# Patient Record
Sex: FEMALE | Race: BLACK OR AFRICAN AMERICAN | Employment: OTHER | URBAN - METROPOLITAN AREA
[De-identification: names, ages, dates, MRNs, and addresses within clinical notes are randomized per-mention and may not be internally consistent; named-entity substitution may affect disease eponyms.]

---

## 2017-05-07 ENCOUNTER — APPOINTMENT (OUTPATIENT)
Dept: CT IMAGING | Facility: CLINIC | Age: 82
End: 2017-05-07
Attending: EMERGENCY MEDICINE
Payer: MEDICARE

## 2017-05-07 ENCOUNTER — HOSPITAL ENCOUNTER (EMERGENCY)
Facility: CLINIC | Age: 82
Discharge: HOME | End: 2017-05-07
Attending: EMERGENCY MEDICINE | Admitting: EMERGENCY MEDICINE
Payer: MEDICARE

## 2017-05-07 VITALS
DIASTOLIC BLOOD PRESSURE: 84 MMHG | TEMPERATURE: 97.9 F | SYSTOLIC BLOOD PRESSURE: 167 MMHG | HEART RATE: 68 BPM | RESPIRATION RATE: 20 BRPM | OXYGEN SATURATION: 99 %
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
## 2017-05-07 DIAGNOSIS — N39.0 UTI (URINARY TRACT INFECTION), UNCOMPLICATED: ICD-10-CM

## 2017-05-07 DIAGNOSIS — S09.90XA CLOSED HEAD INJURY, INITIAL ENCOUNTER: ICD-10-CM

## 2017-05-07 LAB
ALBUMIN UR-MCNC: 30 MG/DL
AMORPH CRY #/AREA URNS HPF: ABNORMAL /HPF
ANION GAP SERPL CALCULATED.3IONS-SCNC: 5 MMOL/L (ref 3–14)
APPEARANCE UR: ABNORMAL
BASOPHILS # BLD AUTO: 0 10E9/L (ref 0–0.2)
BASOPHILS NFR BLD AUTO: 0.6 %
BILIRUB UR QL STRIP: NEGATIVE
BUN SERPL-MCNC: 28 MG/DL (ref 7–30)
CALCIUM SERPL-MCNC: 8.9 MG/DL (ref 8.5–10.1)
CHLORIDE SERPL-SCNC: 108 MMOL/L (ref 94–109)
CO2 SERPL-SCNC: 30 MMOL/L (ref 20–32)
COLOR UR AUTO: YELLOW
CREAT SERPL-MCNC: 1.25 MG/DL (ref 0.52–1.04)
DIFFERENTIAL METHOD BLD: ABNORMAL
EOSINOPHIL # BLD AUTO: 0.2 10E9/L (ref 0–0.7)
EOSINOPHIL NFR BLD AUTO: 2.8 %
ERYTHROCYTE [DISTWIDTH] IN BLOOD BY AUTOMATED COUNT: 12.5 % (ref 10–15)
GFR SERPL CREATININE-BSD FRML MDRD: 41 ML/MIN/1.7M2
GLUCOSE BLDC GLUCOMTR-MCNC: 102 MG/DL (ref 70–99)
GLUCOSE SERPL-MCNC: 118 MG/DL (ref 70–99)
GLUCOSE UR STRIP-MCNC: NEGATIVE MG/DL
HCT VFR BLD AUTO: 31.9 % (ref 35–47)
HGB BLD-MCNC: 10.4 G/DL (ref 11.7–15.7)
HGB UR QL STRIP: NEGATIVE
IMM GRANULOCYTES # BLD: 0 10E9/L (ref 0–0.4)
IMM GRANULOCYTES NFR BLD: 0.2 %
INR PPP: 0.97 (ref 0.86–1.14)
KETONES UR STRIP-MCNC: NEGATIVE MG/DL
LEUKOCYTE ESTERASE UR QL STRIP: ABNORMAL
LYMPHOCYTES # BLD AUTO: 1.6 10E9/L (ref 0.8–5.3)
LYMPHOCYTES NFR BLD AUTO: 30.3 %
MCH RBC QN AUTO: 30.1 PG (ref 26.5–33)
MCHC RBC AUTO-ENTMCNC: 32.6 G/DL (ref 31.5–36.5)
MCV RBC AUTO: 92 FL (ref 78–100)
MONOCYTES # BLD AUTO: 0.5 10E9/L (ref 0–1.3)
MONOCYTES NFR BLD AUTO: 8.7 %
MUCOUS THREADS #/AREA URNS LPF: PRESENT /LPF
NEUTROPHILS # BLD AUTO: 3.1 10E9/L (ref 1.6–8.3)
NEUTROPHILS NFR BLD AUTO: 57.4 %
NITRATE UR QL: NEGATIVE
NRBC # BLD AUTO: 0 10*3/UL
NRBC BLD AUTO-RTO: 0 /100
PH UR STRIP: 6 PH (ref 5–7)
PLATELET # BLD AUTO: 176 10E9/L (ref 150–450)
POTASSIUM SERPL-SCNC: 4 MMOL/L (ref 3.4–5.3)
RBC # BLD AUTO: 3.46 10E12/L (ref 3.8–5.2)
RBC #/AREA URNS AUTO: <1 /HPF (ref 0–2)
SODIUM SERPL-SCNC: 143 MMOL/L (ref 133–144)
SP GR UR STRIP: 1.01 (ref 1–1.03)
SQUAMOUS #/AREA URNS AUTO: 1 /HPF (ref 0–1)
TROPONIN I SERPL-MCNC: 0.02 UG/L (ref 0–0.04)
URN SPEC COLLECT METH UR: ABNORMAL
UROBILINOGEN UR STRIP-MCNC: 0 MG/DL (ref 0–2)
WBC # BLD AUTO: 5.3 10E9/L (ref 4–11)
WBC #/AREA URNS AUTO: 10 /HPF (ref 0–2)

## 2017-05-07 PROCEDURE — 84484 ASSAY OF TROPONIN QUANT: CPT | Performed by: EMERGENCY MEDICINE

## 2017-05-07 PROCEDURE — 70450 CT HEAD/BRAIN W/O DYE: CPT

## 2017-05-07 PROCEDURE — 93005 ELECTROCARDIOGRAM TRACING: CPT

## 2017-05-07 PROCEDURE — 25000128 H RX IP 250 OP 636: Performed by: EMERGENCY MEDICINE

## 2017-05-07 PROCEDURE — 81001 URINALYSIS AUTO W/SCOPE: CPT | Performed by: EMERGENCY MEDICINE

## 2017-05-07 PROCEDURE — 99285 EMERGENCY DEPT VISIT HI MDM: CPT | Mod: 25

## 2017-05-07 PROCEDURE — 85610 PROTHROMBIN TIME: CPT | Performed by: EMERGENCY MEDICINE

## 2017-05-07 PROCEDURE — 85025 COMPLETE CBC W/AUTO DIFF WBC: CPT | Performed by: EMERGENCY MEDICINE

## 2017-05-07 PROCEDURE — 00000146 ZZHCL STATISTIC GLUCOSE BY METER IP

## 2017-05-07 PROCEDURE — 87086 URINE CULTURE/COLONY COUNT: CPT | Performed by: EMERGENCY MEDICINE

## 2017-05-07 PROCEDURE — 80048 BASIC METABOLIC PNL TOTAL CA: CPT | Performed by: EMERGENCY MEDICINE

## 2017-05-07 PROCEDURE — 96361 HYDRATE IV INFUSION ADD-ON: CPT

## 2017-05-07 PROCEDURE — 96365 THER/PROPH/DIAG IV INF INIT: CPT

## 2017-05-07 RX ORDER — CEFTRIAXONE 1 G/1
1 INJECTION, POWDER, FOR SOLUTION INTRAMUSCULAR; INTRAVENOUS ONCE
Status: COMPLETED | OUTPATIENT
Start: 2017-05-07 | End: 2017-05-07

## 2017-05-07 RX ORDER — CEFTRIAXONE SODIUM 1 G
VIAL (EA) INJECTION
Status: DISCONTINUED
Start: 2017-05-07 | End: 2017-05-07 | Stop reason: HOSPADM

## 2017-05-07 RX ORDER — CEPHALEXIN 500 MG/1
500 CAPSULE ORAL 4 TIMES DAILY
Qty: 28 CAPSULE | Refills: 0 | Status: ON HOLD | OUTPATIENT
Start: 2017-05-07 | End: 2017-05-13

## 2017-05-07 RX ORDER — LIDOCAINE 40 MG/G
CREAM TOPICAL
Status: DISCONTINUED | OUTPATIENT
Start: 2017-05-07 | End: 2017-05-07 | Stop reason: HOSPADM

## 2017-05-07 RX ADMIN — CEFTRIAXONE 1 G: 1 INJECTION, POWDER, FOR SOLUTION INTRAMUSCULAR; INTRAVENOUS at 11:47

## 2017-05-07 RX ADMIN — SODIUM CHLORIDE 500 ML: 9 INJECTION, SOLUTION INTRAVENOUS at 09:52

## 2017-05-07 ASSESSMENT — ENCOUNTER SYMPTOMS
DIZZINESS: 1
WOUND: 0
APPETITE CHANGE: 0
HEADACHES: 1

## 2017-05-07 NOTE — ED NOTES
Patient presents complaining of dizziness, and reports that she fell 4 days ago and hit her head on the night stand. She states the dizziness began yesterday and has become worse. She arrives alert and oriented, ABCs intact.

## 2017-05-07 NOTE — ED AVS SNAPSHOT
[facility] Emergency Department    [address]    [facility] [address]    Phone:  [phone]    Fax:  [phone]                                       Araseli Pavon   MRN: [id number]    Department:  [facility] Emergency Department   Date of Visit:  5/7/2017           Patient Information     Date Of Birth          1935        Your diagnoses for this visit were:     Fall, initial encounter     Closed head injury, initial encounter     UTI (urinary tract infection), uncomplicated        You were seen by Melecio Croft MD.      Follow-up Information     Follow up with Willam Calderon MD. Schedule an appointment as soon as possible for a visit in 2 days.    Specialty:  Family Practice    Contact information:    [address]  [address]  [facility] [address]  [phone]          Discharge Instructions       Discharge Instructions  Trauma    You were seen today for an injury due to some kind of trauma (crash, fall, etc.).  Some injuries may not show up until after you leave the Emergency Department.  It is important that you pay attention to these instructions and follow-up with your regular doctor as instructed.    Return to the Emergency Department right away if:    You have abdominal pain or bruises, chest pain, pain in a new area, or pain that is getting worse.    You get short of breath.    You develop a fever over 101 degrees.    You have weakness in your arms or legs.    You faint or you are very lightheaded.    You have any new symptoms, you are feeling weak or unusually ill, or something worries you.     Injuries to the brain are possible with any accident.  Return right away if you have confusion, vomiting more than once, difficulty walking or a headache that is getting worse. Bring a child or a person who can t talk back if they seem to be behaving in an abnormal way.      MORE INFORMATION:    General  Injuries:    Aches and pains are usually worse the day after your accident, but should not be severe, and should start getting better after that. Aches and pains are common in the neck and back.    Injuries from your accident may prevent you from working.  Follow-up with your regular doctor to get a work note and to find out how long you will not be able to work.    Pain medications or your injuries may make it unsafe for you to drive or operate machinery.    Use ice to injured areas for the first one or two days. Apply a bag of ice wrapped in a cloth for about 15 minutes at a time. You can do this as often as once an hour. Do not sleep with an ice pack, since it can burn you.     You can use non-prescription pain medicine, like Tylenol  (acetaminophen), Advil  (ibuprofen), Motrin  (ibuprofen), Nuprin  (ibuprofen) if your emergency doctor or your own doctor told you this is okay. Tylenol  (acetaminophen) is in many prescription medicines and non-prescription medicines--check all of your medicines to be sure you aren t taking more than 3000 mg per day.    Limit your activity for at least one or two days. Avoid doing things that hurt.    You need to see your doctor if any injured area is not back to normal in 1 week.    Car Accident:    If you have been on a backboard or had a neck collar on, this may make you stiff and sore.  This should get better in 1-2 days.  Return to the Emergency Department if the pain or discomfort is severe or gets worse.    Be careful of shards of glass on your body or in your belongings.    Fractures, Sprains, and Strains:    Return to the Emergency Department right away if your injured area gets more painful, if the splint or dressing seems to be too tight, if it gets numb or tingly past the injury, or if the area past the injury gets pale, blue, or cold.    Use your crutches if you were given them today. Don t put weight on the injured area until the pain is gone.    Keep the injured area  above the level of your heart while laying or sitting down.  This well help lessen the swelling (puffiness) and the pain.    You may use an elastic bandage (Ace  Wrap) if it makes you more comfortable. Wrap it just tight enough to provide mild compression, and loosen it if you get swelling past the bandage.    Note about X-rays: If you had x-rays done today, they were read by your emergency physician. They will also be read later by a radiologist. We will contact you if the radiologist thinks they show something different than the emergency physician did. Remember that there are some fractures (breaks in the bone) that can t be seen right away. Even if your x-rays today were normal, you must see your doctor in clinic to re-check.     Splints:    A splint put on in the Emergency Department is temporary. Your regular doctor or orthopedic doctor will remove it, and replace it with a cast or boot if needed.    Keep the splint dry. Cover it with a plastic bag when you wash. Even with a plastic bag, you still can t get in water or let water get right on it. If it does get wet, you should come back or see your doctor to have it replaced.    Do not put objects inside the splint to scratch.    If there is an elastic bandage (Ace  Wrap) holding the splint on this may be loosened a little to relieve pressure or pain.  If pain continues return to the Emergency Department right away.    Return if the splint starts cutting into your skin.    Do not remove your splint by yourself unless told to by your doctor. You can t take it off and put it back on again.     Wounds:    Infections can follow many injuries. Watch for fevers, redness spreading from the wound, pus or stitches that open up. Return here or see your doctor if these happen.    There can always be glass, wood, dirt or other things in any wound.  They won t always show up even on x-rays.  If a wound doesn t heal, this may be why, and it is important to follow-up with  your regular doctor. Small pieces of glass or other materials may work their way out on their own.    Cuts or scrapes may start to bleed after leaving the Emergency Department.  If this happens, hold pressure on the bleeding area with a clean cloth or put pressure over the bandage.  If the bleeding doesn t stop after you use constant pressure for   hour, you should return to the Emergency Department for further treatment.    Any bandage or dressing put on here should be removed in 12-24 hours, or as your doctor instructs. Remove the dressing sooner if it seems too tight or painful, or if it is getting numb, tingly, or pale past the dressing.    After you take off the dressing, wash the cut or scrape with soap and water once or twice a day.    Apply ointment like Bacitracin  (polypeptide antibiotic) to scrapes or cuts, and keep them covered with a Band-Aid  or gauze if possible, until they heal up or until your stitches are taken out.    Dermabond  or Steri-Strips  should be left alone and will come off by themselves.  Dissolving stitches should go away or fall out within about a week.    Regular stitches need to be taken out by your doctor in clinic.  Call today and schedule an appointment.  Leave your stitches in for as long as you were told today.    Most injuries are preventable!  As your local emergency physicians, we encourage you to:    Wear your seat belt.    Do not talk on your cell phone while driving.    Do not read or send text messages while driving.    Wear a bike or motorcycle helmet.    Wear a helmet while skiing and snowboarding.    Wear personal flotation devices at all times while on the water.    Always have your child in a car seat.    Do not allow children less than 12 years old to ride in the front seat.    Go to the CDC website to find more information on preventing injures:  http://www.cdc.gov/injury/index.html    If you were given a prescription for medicine here today, be sure to read all  of the information (including the package insert) that comes with your prescription.  This will include important information about the medicine, its side effects, and any warnings that you need to know about.  The pharmacist who fills the prescription can provide more information and answer questions you may have about the medicine.  If you have questions or concerns that the pharmacist cannot address, please call or return to the Emergency Department.     Opioid Medication Information    Pain medications are among the most commonly prescribed medicines, so we are including this information for all our patients. If you did not receive pain medication or get a prescription for pain medicine, you can ignore it.     You may have been given a prescription for an opioid (narcotic) pain medicine and/or have received a pain medicine while here in the Emergency Department. These medicines can make you drowsy or impaired. You must not drive, operate dangerous equipment, or engage in any other dangerous activities while taking these medications. If you drive while taking these medications, you could be arrested for DUI, or driving under the influence. Do not drink any alcohol while you are taking these medications.     Opioid pain medications can cause addiction. If you have a history of chemical dependency of any type, you are at a higher risk of becoming addicted to pain medications.  Only take these prescribed medications to treat your pain when all other options have been tried. Take it for as short a time and as few doses as possible. Store your pain pills in a secure place, as they are frequently stolen and provide a dangerous opportunity for children or visitors in your house to start abusing these powerful medications. We will not replace any lost or stolen medicine.  As soon as your pain is better, you should flush all your remaining medication.     Many prescription pain medications contain Tylenol   (acetaminophen), including Vicodin , Tylenol #3 , Norco , Lortab , and Percocet .  You should not take any extra pills of Tylenol  if you are using these prescription medications or you can get very sick.  Do not ever take more than 3000 mg of acetaminophen in any 24 hour period.    All opioids tend to cause constipation. Drink plenty of water and eat foods that have a lot of fiber, such as fruits, vegetables, prune juice, apple juice and high fiber cereal.  Take a laxative if you don t move your bowels at least every other day. Miralax , Milk of Magnesia, Colace , or Senna  can be used to keep you regular.      Remember that you can always come back to the Emergency Department if you are not able to see your regular doctor in the amount of time listed above, if you get any new symptoms, or if there is anything that worries you.      Discharge Instructions  Urinary Tract Infection  You have urinary tract infection, or UTI. The urinary tract includes the kidneys (which make urine), ureters (the tubes that carry urine from the kidneys to the bladder), the bladder (which stores urine), and urethra (the tube that carries urine out of the bladder).  Urinary tract infections occur when bacteria travel up the urethra into the bladder. We suspect a UTI based on chemical and microscopic findings in your urine, but if there is a question about your findings, we will do a culture to see if bacteria grow. A urine culture takes several days. You should always follow-up with your primary physician to find out about results of your culture if one was done.   Return to the Emergency Department if:    You have severe back pain.    You are vomiting so that you can t take your medicine, or have signs of dehydration (such as urinating less than 3 times per day).    You have fever over 101.5 degrees F.    You have significant confusion or are very weak, or feel very ill.    Your child seems much more ill, won t wake up, won t respond  right, or is crying for a long time and won t calm down.    Your child is showing signs of dehydration, Signs of dehydration can be:  o Your infant has had no wet diapers in 4-5 hours.  o Your older child has not passed urine in 6-8 hours.  o Your infant or child starts to have dry mouth and lips, or no saliva or tears.    Follow-up with your doctor:     Children under 24 months need to be seen by their regular doctor within one week after a diagnosis of a UTI. It may be necessary to do some imaging tests to look at the child s kidney or bladder.    You should begin to feel better within 24 - 48 hours of starting your antibiotic.  If you do not, you need to be seen again.      Treatment:     You will be treated with an antibiotic to kill the bacteria. We have to make an educated guess as to which antibiotic will work for your infection. In most healthy people, we can guess right almost all of the time. Sometimes a culture is done to show which antibiotics will work. This usually takes 2-3 days. When the culture is done, we may have to contact you to put you on a different antibiotic.    Take a pain medication such as Tylenol  (acetaminophen), Advil  (ibuprofen), Nuprin  (ibuprofen), or Aleve  (naproxen). If you have been given a narcotic such as Vicodin  (hydrocodone with acetaminophen), Percocet  (oxycodone with acetaminophen), or codeine, do not drive for four hours after you have taken it. If the narcotic contains Tylenol  (acetaminophen), do not take Tylenol  with it. All narcotics will cause constipation, so eat a high fiber diet.      Pyridium  (phenazopyridine) or Uristat  (phenazopyridine) is a prescription medication that numbs the bladder to reduce the burning pain of some UTIs.  The same medication is available in a non-prescription version called Azo-Standard  (phenazopyridine), Urodol  (phenazopyridine), or other brand names. This medication will change the color of the urine and tears (usually blue or  "orange). If you wear contacts, do not wear them while taking this medication as they may be stained by the medication.    Antibiotic Warning:     If you have been placed on antibiotics - watch for signs of allergic reaction.  These include rash, lip swelling, difficulty breathing, wheezing, and dizziness.  If you develop any of these symptoms, stop the antibiotic immediately and go to an emergency room or urgent care for evaluation.    Probiotics: If you have been given an antibiotic, you may want to also take a probiotic pill or eat yogurt with live cultures. Probiotics have \"good bacteria\" to help your intestines stay healthy. Studies have shown that probiotics help prevent diarrhea and other intestine problems (including C. diff infection) when you take antibiotics. You can buy these without a prescription in the pharmacy section of the store.   If you were given a prescription for medicine here today, be sure to read all of the information (including the package insert) that comes with your prescription.  This will include important information about the medicine, its side effects, and any warnings that you need to know about.  The pharmacist who fills the prescription can provide more information and answer questions you may have about the medicine.  If you have questions or concerns that the pharmacist cannot address, please call or return to the Emergency Department.   Opioid Medication Information    Pain medications are among the most commonly prescribed medicines, so we are including this information for all our patients. If you did not receive pain medication or get a prescription for pain medicine, you can ignore it.     You may have been given a prescription for an opioid (narcotic) pain medicine and/or have received a pain medicine while here in the Emergency Department. These medicines can make you drowsy or impaired. You must not drive, operate dangerous equipment, or engage in any other dangerous " activities while taking these medications. If you drive while taking these medications, you could be arrested for DUI, or driving under the influence. Do not drink any alcohol while you are taking these medications.     Opioid pain medications can cause addiction. If you have a history of chemical dependency of any type, you are at a higher risk of becoming addicted to pain medications.  Only take these prescribed medications to treat your pain when all other options have been tried. Take it for as short a time and as few doses as possible. Store your pain pills in a secure place, as they are frequently stolen and provide a dangerous opportunity for children or visitors in your house to start abusing these powerful medications. We will not replace any lost or stolen medicine.  As soon as your pain is better, you should flush all your remaining medication.     Many prescription pain medications contain Tylenol  (acetaminophen), including Vicodin , Tylenol #3 , Norco , Lortab , and Percocet .  You should not take any extra pills of Tylenol  if you are using these prescription medications or you can get very sick.  Do not ever take more than 3000 mg of acetaminophen in any 24 hour period.    All opioids tend to cause constipation. Drink plenty of water and eat foods that have a lot of fiber, such as fruits, vegetables, prune juice, apple juice and high fiber cereal.  Take a laxative if you don t move your bowels at least every other day. Miralax , Milk of Magnesia, Colace , or Senna  can be used to keep you regular.      Remember that you can always come back to the Emergency Department if you are not able to see your regular doctor in the amount of time listed above, if you get any new symptoms, or if there is anything that worries you.          24 Hour Appointment Hotline       To make an appointment at any [facility], call [phone] ([phone]). If you don't have a family doctor or clinic, we will  help you find one. [facility] are conveniently located to serve the needs of you and your family.             Review of your medicines      START taking        Dose / Directions Last dose taken    cephALEXin 500 MG capsule   Commonly known as:  KEFLEX   Dose:  500 mg   Quantity:  28 capsule        Take 1 capsule (500 mg) by mouth 4 times daily for 7 days   Refills:  0          Our records show that you are taking the medicines listed below. If these are incorrect, please call your family doctor or clinic.        Dose / Directions Last dose taken    acetaminophen 500 MG tablet   Commonly known as:  TYLENOL   Dose:  1000 mg        Take 1,000 mg by mouth 3 times daily as needed for mild pain   Refills:  0        aspirin 81 MG EC tablet   Dose:  81 mg        Take 81 mg by mouth At Bedtime   Refills:  0        CRESTOR PO   Dose:  40 mg        Take 40 mg by mouth At Bedtime   Refills:  0        hydrochlorothiazide 25 MG tablet   Commonly known as:  HYDRODIURIL   Dose:  25 mg        Take 25 mg by mouth every morning   Refills:  0        insulin glargine 100 UNIT/ML injection   Commonly known as:  LANTUS   Dose:  10 Units        Inject 10 Units Subcutaneous every morning   Refills:  0        [email or web address] FINEPOINT LANCETS Misc   Quantity:  100 each        Use to test blood sugars 3 times daily or as directed.   Refills:  prn        lisinopril 5 MG tablet   Commonly known as:  PRINIVIL/ZESTRIL   Dose:  5 mg        Take 5 mg by mouth daily   Refills:  0        metoprolol 50 MG tablet   Commonly known as:  LOPRESSOR   Dose:  50 mg   Quantity:  60 tablet        Take 1 tablet (50 mg) by mouth 2 times daily   Refills:  0        OMEGA-3 FISH OIL PO   Dose:  1 g        Take 1 g by mouth At Bedtime   Refills:  0        ONE TOUCH ULTRA test strip   Quantity:  100 strip   Generic drug:  blood glucose monitoring        Use to test blood sugars 3 times daily or as directed.   Refills:  prn        order for DME   Quantity:  1 each         Equipment being ordered: glucometer   Refills:  0        polyethylene glycol powder   Commonly known as:  MIRALAX/GLYCOLAX   Dose:  0.25 capful        Take 0.25 capfuls by mouth daily as needed for constipation   Refills:  0        potassium chloride SA 20 MEQ CR tablet   Commonly known as:  K-DUR/KLOR-CON M   Dose:  20 mEq        Take 20 mEq by mouth 2 times daily   Refills:  0        predniSONE 1 MG tablet   Commonly known as:  DELTASONE   Dose:  4 mg        Take 4 mg by mouth every morning   Refills:  0                Prescriptions were sent or printed at these locations (1 Prescription)                   Other Prescriptions                Printed at Department/Unit printer (1 of 1)         cephALEXin (KEFLEX) 500 MG capsule                Procedures and tests performed during your visit     Basic metabolic panel    CBC with platelets differential    CT Head w/o Contrast    EKG 12-lead, tracing only    Glucose by meter    INR    Peripheral IV catheter    Pulse oximetry nursing    Troponin I    UA with Microscopic    Urine Culture Aerobic Bacterial    Vital signs      Orders Needing Specimen Collection     None      Pending Results     Date and Time Order Name Status Description    5/7/2017 1123 Urine Culture Aerobic Bacterial In process     5/7/2017 0936 EKG 12-lead, tracing only Preliminary             Pending Culture Results     Date and Time Order Name Status Description    5/7/2017 1123 Urine Culture Aerobic Bacterial In process             Pending Results Instructions     If you had any lab results that were not finalized at the time of your Discharge, you can call the ED Lab Result RN at [phone]. You will be contacted by this team for any positive Lab results or changes in treatment. The nurses are available 7 days a week from 10A to 6:30P.  You can leave a message 24 hours per day and they will return your call.        Test Results From Your Hospital Stay        5/7/2017 10:02 AM      Component  Results     Component Value Ref Range & Units Status    WBC 5.3 4.0 - 11.0 10e9/L Final    RBC Count 3.46 (L) 3.8 - 5.2 10e12/L Final    Hemoglobin 10.4 (L) 11.7 - 15.7 g/dL Final    Hematocrit 31.9 (L) 35.0 - 47.0 % Final    MCV 92 78 - 100 fl Final    MCH 30.1 26.5 - 33.0 pg Final    MCHC 32.6 31.5 - 36.5 g/dL Final    RDW 12.5 10.0 - 15.0 % Final    Platelet Count 176 150 - 450 10e9/L Final    Diff Method Automated Method  Final    % Neutrophils 57.4 % Final    % Lymphocytes 30.3 % Final    % Monocytes 8.7 % Final    % Eosinophils 2.8 % Final    % Basophils 0.6 % Final    % Immature Granulocytes 0.2 % Final    Nucleated RBCs 0 0 /100 Final    Absolute Neutrophil 3.1 1.6 - 8.3 10e9/L Final    Absolute Lymphocytes 1.6 0.8 - 5.3 10e9/L Final    Absolute Monocytes 0.5 0.0 - 1.3 10e9/L Final    Absolute Eosinophils 0.2 0.0 - 0.7 10e9/L Final    Absolute Basophils 0.0 0.0 - 0.2 10e9/L Final    Abs Immature Granulocytes 0.0 0 - 0.4 10e9/L Final    Absolute Nucleated RBC 0.0  Final         5/7/2017 10:14 AM      Component Results     Component Value Ref Range & Units Status    INR 0.97 0.86 - 1.14 Final         5/7/2017 10:24 AM      Component Results     Component Value Ref Range & Units Status    Sodium 143 133 - 144 mmol/L Final    Potassium 4.0 3.4 - 5.3 mmol/L Final    Chloride 108 94 - 109 mmol/L Final    Carbon Dioxide 30 20 - 32 mmol/L Final    Anion Gap 5 3 - 14 mmol/L Final    Glucose 118 (H) 70 - 99 mg/dL Final    Urea Nitrogen 28 7 - 30 mg/dL Final    Creatinine 1.25 (H) 0.52 - 1.04 mg/dL Final    GFR Estimate 41 (L) >60 mL/min/1.7m2 Final    Non  GFR Calc    GFR Estimate If Black 50 (L) >60 mL/min/1.7m2 Final    African American GFR Calc    Calcium 8.9 8.5 - 10.1 mg/dL Final         5/7/2017 10:24 AM      Component Results     Component Value Ref Range & Units Status    Troponin I ES 0.016 0.000 - 0.045 ug/L Final    The 99th percentile for upper reference range is 0.045 ug/L.  Troponin  values   in   the range of 0.045 - 0.120 ug/L may be associated with risks of adverse   clinical events.           5/7/2017 11:17 AM      Component Results     Component Value Ref Range & Units Status    Color Urine Yellow  Final    Appearance Urine Slightly Cloudy  Final    Glucose Urine Negative NEG mg/dL Final    Bilirubin Urine Negative NEG Final    Ketones Urine Negative NEG mg/dL Final    Specific Gravity Urine 1.015 1.003 - 1.035 Final    Blood Urine Negative NEG Final    pH Urine 6.0 5.0 - 7.0 pH Final    Protein Albumin Urine 30 (A) NEG mg/dL Final    Urobilinogen mg/dL 0.0 0.0 - 2.0 mg/dL Final    Nitrite Urine Negative NEG Final    Leukocyte Esterase Urine Small (A) NEG Final    Source Midstream Urine  Final    WBC Urine 10 (H) 0 - 2 /HPF Final    RBC Urine <1 0 - 2 /HPF Final    Squamous Epithelial /HPF Urine 1 0 - 1 /HPF Final    Mucous Urine Present (A) NEG /LPF Final    Amorphous Crystals Few (A) NEG /HPF Final         5/7/2017 10:48 AM      Narrative     CT HEAD W/O CONTRAST   5/7/2017 10:43 AM     HISTORY:  fall, head trauma     TECHNIQUE:  5 mm thick axial images of the head. Radiation dose for  this scan was reduced using automated exposure control, adjustment of  the mA and/or KV according to patient size, or iterative  reconstruction technique.     COMPARISON: None.    FINDINGS: There is no evidence of intracranial hemorrhage, mass, acute  infarct or anomaly.         Impression     IMPRESSION: Negative CT scan of the head.    ARLET CASIANO MD         5/7/2017 11:16 AM      Component Results     Component Value Ref Range & Units Status    Glucose 102 (H) 70 - 99 mg/dL Final    Dr/RN Notified         5/7/2017 11:33 AM                Clinical Quality Measure: Blood Pressure Screening     Your blood pressure was checked while you were in the emergency department today. The last reading we obtained was  BP: 139/74 . Please read the guidelines below about what these numbers mean and what you should  "do about them.  If your systolic blood pressure (the top number) is less than 120 and your diastolic blood pressure (the bottom number) is less than 80, then your blood pressure is normal. There is nothing more that you need to do about it.  If your systolic blood pressure (the top number) is 120-139 or your diastolic blood pressure (the bottom number) is 80-89, your blood pressure may be higher than it should be. You should have your blood pressure rechecked within a year by a primary care provider.  If your systolic blood pressure (the top number) is 140 or greater or your diastolic blood pressure (the bottom number) is 90 or greater, you may have high blood pressure. High blood pressure is treatable, but if left untreated over time it can put you at risk for heart attack, stroke, or kidney failure. You should have your blood pressure rechecked by a primary care provider within the next 4 weeks.  If your provider in the emergency department today gave you specific instructions to follow-up with your doctor or provider even sooner than that, you should follow that instruction and not wait for up to 4 weeks for your follow-up visit.        Thank you for choosing [facility]       Thank you for choosing [facility] for your care. Our goal is always to provide you with excellent care. Hearing back from our patients is one way we can continue to improve our services. Please take a few minutes to complete the written survey that you may receive in the mail after you visit with us. Thank you!        [employer] Information     [employer] lets you send messages to your doctor, view your test results, renew your prescriptions, schedule appointments and more. To sign up, go to [email or web address] . Click on \"Log in\" on the left side of the screen, which will take you to the Welcome page. Then click on \"Sign up Now\" on the right side of the page.     You will be asked to enter the access code listed below, as well as some personal " information. Please follow the directions to create your username and password.     Your access code is: [email or web address]  Expires: 2017 11:48 AM     Your access code will  in 90 days. If you need help or a new code, please call your [facility] clinic or [phone].        Care EveryWhere ID     This is your Care EveryWhere ID. This could be used by other organizations to access your [facility] medical records  [id number]        After Visit Summary       This is your record. Keep this with you and show to your community pharmacist(s) and doctor(s) at your next visit.

## 2017-05-07 NOTE — DISCHARGE INSTRUCTIONS
Discharge Instructions  Trauma    You were seen today for an injury due to some kind of trauma (crash, fall, etc.).  Some injuries may not show up until after you leave the Emergency Department.  It is important that you pay attention to these instructions and follow-up with your regular doctor as instructed.    Return to the Emergency Department right away if:    You have abdominal pain or bruises, chest pain, pain in a new area, or pain that is getting worse.    You get short of breath.    You develop a fever over 101 degrees.    You have weakness in your arms or legs.    You faint or you are very lightheaded.    You have any new symptoms, you are feeling weak or unusually ill, or something worries you.     Injuries to the brain are possible with any accident.  Return right away if you have confusion, vomiting more than once, difficulty walking or a headache that is getting worse. Bring a child or a person who can t talk back if they seem to be behaving in an abnormal way.      MORE INFORMATION:    General Injuries:    Aches and pains are usually worse the day after your accident, but should not be severe, and should start getting better after that. Aches and pains are common in the neck and back.    Injuries from your accident may prevent you from working.  Follow-up with your regular doctor to get a work note and to find out how long you will not be able to work.    Pain medications or your injuries may make it unsafe for you to drive or operate machinery.    Use ice to injured areas for the first one or two days. Apply a bag of ice wrapped in a cloth for about 15 minutes at a time. You can do this as often as once an hour. Do not sleep with an ice pack, since it can burn you.     You can use non-prescription pain medicine, like Tylenol  (acetaminophen), Advil  (ibuprofen), Motrin  (ibuprofen), Nuprin  (ibuprofen) if your emergency doctor or your own doctor told you this is okay. Tylenol  (acetaminophen) is in  many prescription medicines and non-prescription medicines--check all of your medicines to be sure you aren t taking more than 3000 mg per day.    Limit your activity for at least one or two days. Avoid doing things that hurt.    You need to see your doctor if any injured area is not back to normal in 1 week.    Car Accident:    If you have been on a backboard or had a neck collar on, this may make you stiff and sore.  This should get better in 1-2 days.  Return to the Emergency Department if the pain or discomfort is severe or gets worse.    Be careful of shards of glass on your body or in your belongings.    Fractures, Sprains, and Strains:    Return to the Emergency Department right away if your injured area gets more painful, if the splint or dressing seems to be too tight, if it gets numb or tingly past the injury, or if the area past the injury gets pale, blue, or cold.    Use your crutches if you were given them today. Don t put weight on the injured area until the pain is gone.    Keep the injured area above the level of your heart while laying or sitting down.  This well help lessen the swelling (puffiness) and the pain.    You may use an elastic bandage (Ace  Wrap) if it makes you more comfortable. Wrap it just tight enough to provide mild compression, and loosen it if you get swelling past the bandage.    Note about X-rays: If you had x-rays done today, they were read by your emergency physician. They will also be read later by a radiologist. We will contact you if the radiologist thinks they show something different than the emergency physician did. Remember that there are some fractures (breaks in the bone) that can t be seen right away. Even if your x-rays today were normal, you must see your doctor in clinic to re-check.     Splints:    A splint put on in the Emergency Department is temporary. Your regular doctor or orthopedic doctor will remove it, and replace it with a cast or boot if  needed.    Keep the splint dry. Cover it with a plastic bag when you wash. Even with a plastic bag, you still can t get in water or let water get right on it. If it does get wet, you should come back or see your doctor to have it replaced.    Do not put objects inside the splint to scratch.    If there is an elastic bandage (Ace  Wrap) holding the splint on this may be loosened a little to relieve pressure or pain.  If pain continues return to the Emergency Department right away.    Return if the splint starts cutting into your skin.    Do not remove your splint by yourself unless told to by your doctor. You can t take it off and put it back on again.     Wounds:    Infections can follow many injuries. Watch for fevers, redness spreading from the wound, pus or stitches that open up. Return here or see your doctor if these happen.    There can always be glass, wood, dirt or other things in any wound.  They won t always show up even on x-rays.  If a wound doesn t heal, this may be why, and it is important to follow-up with your regular doctor. Small pieces of glass or other materials may work their way out on their own.    Cuts or scrapes may start to bleed after leaving the Emergency Department.  If this happens, hold pressure on the bleeding area with a clean cloth or put pressure over the bandage.  If the bleeding doesn t stop after you use constant pressure for   hour, you should return to the Emergency Department for further treatment.    Any bandage or dressing put on here should be removed in 12-24 hours, or as your doctor instructs. Remove the dressing sooner if it seems too tight or painful, or if it is getting numb, tingly, or pale past the dressing.    After you take off the dressing, wash the cut or scrape with soap and water once or twice a day.    Apply ointment like Bacitracin  (polypeptide antibiotic) to scrapes or cuts, and keep them covered with a Band-Aid  or gauze if possible, until they heal up or  until your stitches are taken out.    Dermabond  or Steri-Strips  should be left alone and will come off by themselves.  Dissolving stitches should go away or fall out within about a week.    Regular stitches need to be taken out by your doctor in clinic.  Call today and schedule an appointment.  Leave your stitches in for as long as you were told today.    Most injuries are preventable!  As your local emergency physicians, we encourage you to:    Wear your seat belt.    Do not talk on your cell phone while driving.    Do not read or send text messages while driving.    Wear a bike or motorcycle helmet.    Wear a helmet while skiing and snowboarding.    Wear personal flotation devices at all times while on the water.    Always have your child in a car seat.    Do not allow children less than 12 years old to ride in the front seat.    Go to the CDC website to find more information on preventing injures:  http://www.cdc.gov/injury/index.html    If you were given a prescription for medicine here today, be sure to read all of the information (including the package insert) that comes with your prescription.  This will include important information about the medicine, its side effects, and any warnings that you need to know about.  The pharmacist who fills the prescription can provide more information and answer questions you may have about the medicine.  If you have questions or concerns that the pharmacist cannot address, please call or return to the Emergency Department.     Opioid Medication Information    Pain medications are among the most commonly prescribed medicines, so we are including this information for all our patients. If you did not receive pain medication or get a prescription for pain medicine, you can ignore it.     You may have been given a prescription for an opioid (narcotic) pain medicine and/or have received a pain medicine while here in the Emergency Department. These medicines can make you drowsy  or impaired. You must not drive, operate dangerous equipment, or engage in any other dangerous activities while taking these medications. If you drive while taking these medications, you could be arrested for DUI, or driving under the influence. Do not drink any alcohol while you are taking these medications.     Opioid pain medications can cause addiction. If you have a history of chemical dependency of any type, you are at a higher risk of becoming addicted to pain medications.  Only take these prescribed medications to treat your pain when all other options have been tried. Take it for as short a time and as few doses as possible. Store your pain pills in a secure place, as they are frequently stolen and provide a dangerous opportunity for children or visitors in your house to start abusing these powerful medications. We will not replace any lost or stolen medicine.  As soon as your pain is better, you should flush all your remaining medication.     Many prescription pain medications contain Tylenol  (acetaminophen), including Vicodin , Tylenol #3 , Norco , Lortab , and Percocet .  You should not take any extra pills of Tylenol  if you are using these prescription medications or you can get very sick.  Do not ever take more than 3000 mg of acetaminophen in any 24 hour period.    All opioids tend to cause constipation. Drink plenty of water and eat foods that have a lot of fiber, such as fruits, vegetables, prune juice, apple juice and high fiber cereal.  Take a laxative if you don t move your bowels at least every other day. Miralax , Milk of Magnesia, Colace , or Senna  can be used to keep you regular.      Remember that you can always come back to the Emergency Department if you are not able to see your regular doctor in the amount of time listed above, if you get any new symptoms, or if there is anything that worries you.      Discharge Instructions  Urinary Tract Infection  You have urinary tract infection,  or UTI. The urinary tract includes the kidneys (which make urine), ureters (the tubes that carry urine from the kidneys to the bladder), the bladder (which stores urine), and urethra (the tube that carries urine out of the bladder).  Urinary tract infections occur when bacteria travel up the urethra into the bladder. We suspect a UTI based on chemical and microscopic findings in your urine, but if there is a question about your findings, we will do a culture to see if bacteria grow. A urine culture takes several days. You should always follow-up with your primary physician to find out about results of your culture if one was done.   Return to the Emergency Department if:    You have severe back pain.    You are vomiting so that you can t take your medicine, or have signs of dehydration (such as urinating less than 3 times per day).    You have fever over 101.5 degrees F.    You have significant confusion or are very weak, or feel very ill.    Your child seems much more ill, won t wake up, won t respond right, or is crying for a long time and won t calm down.    Your child is showing signs of dehydration, Signs of dehydration can be:  o Your infant has had no wet diapers in 4-5 hours.  o Your older child has not passed urine in 6-8 hours.  o Your infant or child starts to have dry mouth and lips, or no saliva or tears.    Follow-up with your doctor:     Children under 24 months need to be seen by their regular doctor within one week after a diagnosis of a UTI. It may be necessary to do some imaging tests to look at the child s kidney or bladder.    You should begin to feel better within 24 - 48 hours of starting your antibiotic.  If you do not, you need to be seen again.      Treatment:     You will be treated with an antibiotic to kill the bacteria. We have to make an educated guess as to which antibiotic will work for your infection. In most healthy people, we can guess right almost all of the time. Sometimes a  "culture is done to show which antibiotics will work. This usually takes 2-3 days. When the culture is done, we may have to contact you to put you on a different antibiotic.    Take a pain medication such as Tylenol  (acetaminophen), Advil  (ibuprofen), Nuprin  (ibuprofen), or Aleve  (naproxen). If you have been given a narcotic such as Vicodin  (hydrocodone with acetaminophen), Percocet  (oxycodone with acetaminophen), or codeine, do not drive for four hours after you have taken it. If the narcotic contains Tylenol  (acetaminophen), do not take Tylenol  with it. All narcotics will cause constipation, so eat a high fiber diet.      Pyridium  (phenazopyridine) or Uristat  (phenazopyridine) is a prescription medication that numbs the bladder to reduce the burning pain of some UTIs.  The same medication is available in a non-prescription version called Azo-Standard  (phenazopyridine), Urodol  (phenazopyridine), or other brand names. This medication will change the color of the urine and tears (usually blue or orange). If you wear contacts, do not wear them while taking this medication as they may be stained by the medication.    Antibiotic Warning:     If you have been placed on antibiotics - watch for signs of allergic reaction.  These include rash, lip swelling, difficulty breathing, wheezing, and dizziness.  If you develop any of these symptoms, stop the antibiotic immediately and go to an emergency room or urgent care for evaluation.    Probiotics: If you have been given an antibiotic, you may want to also take a probiotic pill or eat yogurt with live cultures. Probiotics have \"good bacteria\" to help your intestines stay healthy. Studies have shown that probiotics help prevent diarrhea and other intestine problems (including C. diff infection) when you take antibiotics. You can buy these without a prescription in the pharmacy section of the store.   If you were given a prescription for medicine here today, be sure " to read all of the information (including the package insert) that comes with your prescription.  This will include important information about the medicine, its side effects, and any warnings that you need to know about.  The pharmacist who fills the prescription can provide more information and answer questions you may have about the medicine.  If you have questions or concerns that the pharmacist cannot address, please call or return to the Emergency Department.   Opioid Medication Information    Pain medications are among the most commonly prescribed medicines, so we are including this information for all our patients. If you did not receive pain medication or get a prescription for pain medicine, you can ignore it.     You may have been given a prescription for an opioid (narcotic) pain medicine and/or have received a pain medicine while here in the Emergency Department. These medicines can make you drowsy or impaired. You must not drive, operate dangerous equipment, or engage in any other dangerous activities while taking these medications. If you drive while taking these medications, you could be arrested for DUI, or driving under the influence. Do not drink any alcohol while you are taking these medications.     Opioid pain medications can cause addiction. If you have a history of chemical dependency of any type, you are at a higher risk of becoming addicted to pain medications.  Only take these prescribed medications to treat your pain when all other options have been tried. Take it for as short a time and as few doses as possible. Store your pain pills in a secure place, as they are frequently stolen and provide a dangerous opportunity for children or visitors in your house to start abusing these powerful medications. We will not replace any lost or stolen medicine.  As soon as your pain is better, you should flush all your remaining medication.     Many prescription pain medications contain Tylenol   (acetaminophen), including Vicodin , Tylenol #3 , Norco , Lortab , and Percocet .  You should not take any extra pills of Tylenol  if you are using these prescription medications or you can get very sick.  Do not ever take more than 3000 mg of acetaminophen in any 24 hour period.    All opioids tend to cause constipation. Drink plenty of water and eat foods that have a lot of fiber, such as fruits, vegetables, prune juice, apple juice and high fiber cereal.  Take a laxative if you don t move your bowels at least every other day. Miralax , Milk of Magnesia, Colace , or Senna  can be used to keep you regular.      Remember that you can always come back to the Emergency Department if you are not able to see your regular doctor in the amount of time listed above, if you get any new symptoms, or if there is anything that worries you.

## 2017-05-08 LAB
BACTERIA SPEC CULT: NO GROWTH
INTERPRETATION ECG - MUSE: NORMAL
Lab: NORMAL
MICRO REPORT STATUS: NORMAL
SPECIMEN SOURCE: NORMAL

## 2017-05-10 ENCOUNTER — HOSPITAL ENCOUNTER (INPATIENT)
Facility: CLINIC | Age: 82
LOS: 3 days | Discharge: HOME | DRG: 378 | End: 2017-05-13
Attending: EMERGENCY MEDICINE | Admitting: INTERNAL MEDICINE
Payer: MEDICARE

## 2017-05-10 DIAGNOSIS — R29.6 RECURRENT FALLS: ICD-10-CM

## 2017-05-10 DIAGNOSIS — D64.9 ANEMIA, UNSPECIFIED TYPE: ICD-10-CM

## 2017-05-10 DIAGNOSIS — K92.1 MELENA: ICD-10-CM

## 2017-05-10 DIAGNOSIS — I25.10 CORONARY ARTERY DISEASE WITHOUT ANGINA PECTORIS, UNSPECIFIED VESSEL OR LESION TYPE, UNSPECIFIED WHETHER NATIVE OR TRANSPLANTED HEART: Primary | ICD-10-CM

## 2017-05-10 DIAGNOSIS — R42 DIZZINESS: ICD-10-CM

## 2017-05-10 LAB
ANION GAP SERPL CALCULATED.3IONS-SCNC: 6 MMOL/L (ref 3–14)
BASOPHILS # BLD AUTO: 0 10E9/L (ref 0–0.2)
BASOPHILS NFR BLD AUTO: 0.2 %
BUN SERPL-MCNC: 37 MG/DL (ref 7–30)
CALCIUM SERPL-MCNC: 9 MG/DL (ref 8.5–10.1)
CHLORIDE SERPL-SCNC: 106 MMOL/L (ref 94–109)
CO2 SERPL-SCNC: 27 MMOL/L (ref 20–32)
CREAT SERPL-MCNC: 1.14 MG/DL (ref 0.52–1.04)
DIFFERENTIAL METHOD BLD: ABNORMAL
EOSINOPHIL # BLD AUTO: 0.1 10E9/L (ref 0–0.7)
EOSINOPHIL NFR BLD AUTO: 0.6 %
ERYTHROCYTE [DISTWIDTH] IN BLOOD BY AUTOMATED COUNT: 12.7 % (ref 10–15)
GFR SERPL CREATININE-BSD FRML MDRD: 46 ML/MIN/1.7M2
GLUCOSE SERPL-MCNC: 122 MG/DL (ref 70–99)
HCT VFR BLD AUTO: 24.7 % (ref 35–47)
HEMOCCULT STL QL: POSITIVE
HGB BLD-MCNC: 8 G/DL (ref 11.7–15.7)
IMM GRANULOCYTES # BLD: 0 10E9/L (ref 0–0.4)
IMM GRANULOCYTES NFR BLD: 0.2 %
LYMPHOCYTES # BLD AUTO: 2 10E9/L (ref 0.8–5.3)
LYMPHOCYTES NFR BLD AUTO: 24.5 %
MCH RBC QN AUTO: 30 PG (ref 26.5–33)
MCHC RBC AUTO-ENTMCNC: 32.4 G/DL (ref 31.5–36.5)
MCV RBC AUTO: 93 FL (ref 78–100)
MONOCYTES # BLD AUTO: 0.6 10E9/L (ref 0–1.3)
MONOCYTES NFR BLD AUTO: 6.9 %
NEUTROPHILS # BLD AUTO: 5.6 10E9/L (ref 1.6–8.3)
NEUTROPHILS NFR BLD AUTO: 67.6 %
NRBC # BLD AUTO: 0 10*3/UL
NRBC BLD AUTO-RTO: 0 /100
PLATELET # BLD AUTO: 159 10E9/L (ref 150–450)
POTASSIUM SERPL-SCNC: 4.2 MMOL/L (ref 3.4–5.3)
RBC # BLD AUTO: 2.67 10E12/L (ref 3.8–5.2)
SODIUM SERPL-SCNC: 139 MMOL/L (ref 133–144)
WBC # BLD AUTO: 8.3 10E9/L (ref 4–11)

## 2017-05-10 PROCEDURE — A9270 NON-COVERED ITEM OR SERVICE: HCPCS | Mod: GY | Performed by: EMERGENCY MEDICINE

## 2017-05-10 PROCEDURE — 36415 COLL VENOUS BLD VENIPUNCTURE: CPT | Performed by: EMERGENCY MEDICINE

## 2017-05-10 PROCEDURE — 86901 BLOOD TYPING SEROLOGIC RH(D): CPT | Performed by: EMERGENCY MEDICINE

## 2017-05-10 PROCEDURE — 12000000 ZZH R&B MED SURG/OB

## 2017-05-10 PROCEDURE — 25000131 ZZH RX MED GY IP 250 OP 636 PS 637: Mod: GY | Performed by: EMERGENCY MEDICINE

## 2017-05-10 PROCEDURE — 99223 1ST HOSP IP/OBS HIGH 75: CPT | Mod: AI | Performed by: INTERNAL MEDICINE

## 2017-05-10 PROCEDURE — 25000125 ZZHC RX 250: Performed by: EMERGENCY MEDICINE

## 2017-05-10 PROCEDURE — 93005 ELECTROCARDIOGRAM TRACING: CPT

## 2017-05-10 PROCEDURE — 96374 THER/PROPH/DIAG INJ IV PUSH: CPT

## 2017-05-10 PROCEDURE — 80048 BASIC METABOLIC PNL TOTAL CA: CPT | Performed by: EMERGENCY MEDICINE

## 2017-05-10 PROCEDURE — 86900 BLOOD TYPING SEROLOGIC ABO: CPT | Performed by: EMERGENCY MEDICINE

## 2017-05-10 PROCEDURE — 25000128 H RX IP 250 OP 636: Performed by: EMERGENCY MEDICINE

## 2017-05-10 PROCEDURE — 86850 RBC ANTIBODY SCREEN: CPT | Performed by: EMERGENCY MEDICINE

## 2017-05-10 PROCEDURE — 86923 COMPATIBILITY TEST ELECTRIC: CPT | Performed by: EMERGENCY MEDICINE

## 2017-05-10 PROCEDURE — 85025 COMPLETE CBC W/AUTO DIFF WBC: CPT | Performed by: EMERGENCY MEDICINE

## 2017-05-10 PROCEDURE — 99285 EMERGENCY DEPT VISIT HI MDM: CPT

## 2017-05-10 PROCEDURE — 82272 OCCULT BLD FECES 1-3 TESTS: CPT | Performed by: EMERGENCY MEDICINE

## 2017-05-10 PROCEDURE — 25000132 ZZH RX MED GY IP 250 OP 250 PS 637: Mod: GY | Performed by: EMERGENCY MEDICINE

## 2017-05-10 RX ORDER — MECLIZINE HYDROCHLORIDE 25 MG/1
25 TABLET ORAL ONCE
Status: COMPLETED | OUTPATIENT
Start: 2017-05-10 | End: 2017-05-10

## 2017-05-10 RX ORDER — SODIUM CHLORIDE 9 MG/ML
1000 INJECTION, SOLUTION INTRAVENOUS CONTINUOUS
Status: DISCONTINUED | OUTPATIENT
Start: 2017-05-10 | End: 2017-05-11 | Stop reason: ALTCHOICE

## 2017-05-10 RX ORDER — ACETAMINOPHEN 500 MG
1000 TABLET ORAL ONCE
Status: COMPLETED | OUTPATIENT
Start: 2017-05-10 | End: 2017-05-10

## 2017-05-10 RX ORDER — LIDOCAINE 40 MG/G
CREAM TOPICAL
Status: DISCONTINUED | OUTPATIENT
Start: 2017-05-10 | End: 2017-05-13 | Stop reason: HOSPADM

## 2017-05-10 RX ADMIN — MECLIZINE HYDROCHLORIDE 25 MG: 25 TABLET ORAL at 22:15

## 2017-05-10 RX ADMIN — PANTOPRAZOLE SODIUM 40 MG: 40 INJECTION, POWDER, FOR SOLUTION INTRAVENOUS at 22:15

## 2017-05-10 RX ADMIN — ACETAMINOPHEN 1000 MG: 500 TABLET, FILM COATED ORAL at 22:56

## 2017-05-10 RX ADMIN — SODIUM CHLORIDE 1000 ML: 9 INJECTION, SOLUTION INTRAVENOUS at 21:38

## 2017-05-10 NOTE — ED NOTES
ABCs intact. Pt c/o  Intermittent dizziness since Sunday. Pt c/o nausea Sunday and Monday, but denies n/v/d at present. Pt started antibiotic Cephalexin 500mg 1 capsule 4x daily on Sunday for UTI. Pt states she thinks the medication is making her dizzy and stopped taking them yesterday. Pt states she feels like she is spinning.

## 2017-05-10 NOTE — IP AVS SNAPSHOT
[facility]    [address]    [facility], [place] [address]    Phone:  [phone]    Fax:  [phone]                                       After Visit Summary   5/10/2017    Araseli Pavon    MRN: [id number]           After Visit Summary Signature Page     I have received my discharge instructions, and my questions have been answered. I have discussed any challenges I see with this plan with the nurse or doctor.    ..........................................................................................................................................  Patient/Patient Representative Signature      ..........................................................................................................................................  Patient Representative Print Name and Relationship to Patient    ..................................................               ................................................  Date                                            Time    ..........................................................................................................................................  Reviewed by Signature/Title    ...................................................              ..............................................  Date                                                            Time

## 2017-05-10 NOTE — IP AVS SNAPSHOT
MRN:[id number]                      After Visit Summary   5/10/2017    Araseli Pavon    MRN: [id number]           Thank you!     Thank you for choosing [facility] for your care. Our goal is always to provide you with excellent care. Hearing back from our patients is one way we can continue to improve our services. Please take a few minutes to complete the written survey that you may receive in the mail after you visit. If you would like to speak to someone directly about your visit please contact Patient Relations at [phone]. Thank you!          Patient Information     Date Of Birth          1935        Designated Caregiver       Most Recent Value    Caregiver    Will someone help with your care after discharge? no      About your hospital stay     You were admitted on:  May 10, 2017 You last received care in the:  [place] [address] [facility]    You were discharged on:  May 13, 2017        Reason for your hospital stay       Upper GI bleeding                  Who to Call     For medical emergencies, please call 911.  For non-urgent questions about your medical care, please call your primary care provider or clinic, [phone]  For questions related to your surgery, please call your surgery clinic        Attending Provider     Provider Specialty    Stefany Salinas MD Emergency Medicine    RellJuan aguilera MD Internal Medicine       Primary Care Provider Office Phone # Fax #    Willam Herman Calderon [phone]       [facility] [address] [place] [address]        After Care Instructions     Diet       Follow this diet upon discharge: Orders Placed This Encounter     Diabetic Diet Adult                  Follow-up Appointments     Follow-up and recommended labs and tests        Follow-up with primary care physician is 3-4 days with a hemoglobin and basic metabolic panel, follow-up on blood pressure and  "potassium  Follow-up was [facility] on H. pylori results and repeat endoscopy in 8 weeks                  Further instructions from your care team       The patient has received a copy of the Provation  report the doctor has written and discharge instructions have been discussed with the patient and responsible adult.  All questions were addressed and answered prior to patient discharge.      Pending Results     Date and Time Order Name Status Description    2017 0859 H Pylori antigen stool In process             Statement of Approval     Ordered          17 5135  I have reviewed and agree with all the recommendations and orders detailed in this document.  EFFECTIVE NOW     Approved and electronically signed by:  Robbie Garcia MD             Admission Information     Date & Time Provider Department Dept. Phone    5/10/2017 Juan Zacarias MD [place] [address] [facility] [phone]      Your Vitals Were     Blood Pressure Pulse Temperature Respirations Height Weight    141/63 (BP Location: Right arm) 61 99.1  F (37.3  C) (Oral) 16 1.626 m (5' 4\") 61.3 kg (135 lb 1.6 oz)    Pulse Oximetry BMI (Body Mass Index)                96% 23.19 kg/m2          Spreadsave Information     Spreadsave lets you send messages to your doctor, view your test results, renew your prescriptions, schedule appointments and more. To sign up, go to [email or web address] . Click on \"Log in\" on the left side of the screen, which will take you to the Welcome page. Then click on \"Sign up Now\" on the right side of the page.     You will be asked to enter the access code listed below, as well as some personal information. Please follow the directions to create your username and password.     Your access code is: [email or web address]  Expires: 2017 11:48 AM     Your access code will  in 90 days. If you need help or a new code, please call your [facility] or [phone].        Care EveryWhere ID  "    This is your Care EveryWhere ID. This could be used by other organizations to access your [place] medical records  [id number]           Review of your medicines      START taking        Dose / Directions    pantoprazole 40 MG EC tablet   Commonly known as:  PROTONIX        Dose:  40 mg   Take 1 tablet (40 mg) by mouth daily Take 30-60 minutes before a meal.   Quantity:  60 tablet   Refills:  0         CONTINUE these medicines which may have CHANGED, or have new prescriptions. If we are uncertain of the size of tablets/capsules you have at home, strength may be listed as something that might have changed.        Dose / Directions    lisinopril 20 MG tablet   Commonly known as:  PRINIVIL/ZESTRIL   This may have changed:    - medication strength  - how much to take   Used for:  Coronary artery disease without angina pectoris, unspecified vessel or lesion type, unspecified whether native or transplanted heart        Dose:  20 mg   Take 1 tablet (20 mg) by mouth daily   Quantity:  20 tablet   Refills:  0         CONTINUE these medicines which have NOT CHANGED        Dose / Directions    acetaminophen 500 MG tablet   Commonly known as:  TYLENOL        Dose:  1000 mg   Take 1,000 mg by mouth 3 times daily as needed for mild pain   Refills:  0       aspirin 81 MG EC tablet   Used for:  Coronary artery disease without angina pectoris, unspecified vessel or lesion type, unspecified whether native or transplanted heart   Notes to Patient:  TAKE WITH FOOD        Dose:  81 mg   Start taking on:  5/15/2017   Take 1 tablet (81 mg) by mouth At Bedtime   Refills:  0       CRESTOR PO        Dose:  40 mg   Take 40 mg by mouth At Bedtime   Refills:  0       hydrochlorothiazide 25 MG tablet   Commonly known as:  HYDRODIURIL        Dose:  25 mg   Take 25 mg by mouth every morning   Refills:  0       insulin glargine 100 UNIT/ML injection   Commonly known as:  LANTUS   Notes to Patient:  Return to home schedule         Dose:  10  Units   Inject 10 Units Subcutaneous 2 times daily   Refills:  0       LIFESCAN FINEPOINT LANCETS Misc   Used for:  Diabetes mellitus, new onset (H)        Use to test blood sugars 3 times daily or as directed.   Quantity:  100 each   Refills:  prn       metoprolol 50 MG tablet   Commonly known as:  LOPRESSOR   Used for:  Other chest pain        Dose:  50 mg   Take 1 tablet (50 mg) by mouth 2 times daily   Quantity:  60 tablet   Refills:  0       ONE TOUCH ULTRA test strip   Used for:  Diabetes mellitus, new onset (H)   Generic drug:  blood glucose monitoring        Use to test blood sugars 3 times daily or as directed.   Quantity:  100 strip   Refills:  prn       order for DME   Used for:  Diabetes mellitus, new onset (H)        Equipment being ordered: glucometer   Quantity:  1 each   Refills:  0       polyethylene glycol powder   Commonly known as:  MIRALAX/GLYCOLAX        Dose:  0.25 capful   Take 0.25 capfuls by mouth daily as needed for constipation   Refills:  0       predniSONE 1 MG tablet   Commonly known as:  DELTASONE        Dose:  4 mg   Take 4 mg by mouth every morning   Refills:  0         STOP taking     cephALEXin 500 MG capsule   Commonly known as:  KEFLEX           OMEGA-3 FISH OIL PO           potassium chloride SA 20 MEQ CR tablet   Commonly known as:  K-DUR/KLOR-CON M                Where to get your medicines      These medications were sent to [facility] [id number] [place] [id number]     Phone:  [phone]     lisinopril 20 MG tablet    pantoprazole 40 MG EC tablet         Some of these will need a paper prescription and others can be bought over the counter. Ask your nurse if you have questions.     You don't need a prescription for these medications     aspirin 81 MG EC tablet                Protect others around you: Learn how to safely use, store and throw away your medicines at www.disposemymeds.org.              Medication List: This is a list of all your medications and when to take them. Check marks below indicate your daily home schedule. Keep this list as a reference.      Medications           Morning Afternoon Evening Bedtime As Needed    acetaminophen 500 MG tablet   Commonly known as:  TYLENOL   Take 1,000 mg by mouth 3 times daily as needed for mild pain   Last time this was given:  1,000 mg on 5/10/2017 10:56 PM                                   aspirin 81 MG EC tablet   Take 1 tablet (81 mg) by mouth At Bedtime   Start taking on:  5/15/2017   Next Dose Due:  5/13, bedtime    Notes to Patient:  TAKE WITH FOOD                                   CRESTOR PO   Take 40 mg by mouth At Bedtime   Next Dose Due:  5/13, bedtime                                   hydrochlorothiazide 25 MG tablet   Commonly known as:  HYDRODIURIL   Take 25 mg by mouth every morning   Last time this was given:  25 mg on 5/13/2017  8:14 AM   Next Dose Due:  5/14, morning                                   insulin glargine 100 UNIT/ML injection   Commonly known as:  LANTUS   Inject 10 Units Subcutaneous 2 times daily   Notes to Patient:  Return to home schedule                                 FilmBreakCAN FINEPOINT LANCETS Misc   Use to test blood sugars 3 times daily or as directed.                                lisinopril 20 MG tablet   Commonly known as:  PRINIVIL/ZESTRIL   Take 1 tablet (20 mg) by mouth daily   Last time this was given:  20 mg on 5/13/2017  8:14 AM   Next Dose Due:  5/14, morning                                   metoprolol 50 MG tablet   Commonly known as:  LOPRESSOR   Take 1 tablet (50 mg) by mouth 2 times daily   Last time this was given:  50 mg on 5/13/2017  8:14 AM   Next Dose Due:  5/13, 9pm                                      ONE TOUCH ULTRA test strip   Use to test blood sugars 3 times daily or as directed.   Generic drug:  blood glucose monitoring                                order for DME   Equipment being ordered:  glucometer                                pantoprazole 40 MG EC tablet   Commonly known as:  PROTONIX   Take 1 tablet (40 mg) by mouth daily Take 30-60 minutes before a meal.   Next Dose Due:  5/14, morning                                   polyethylene glycol powder   Commonly known as:  MIRALAX/GLYCOLAX   Take 0.25 capfuls by mouth daily as needed for constipation                                   predniSONE 1 MG tablet   Commonly known as:  DELTASONE   Take 4 mg by mouth every morning   Last time this was given:  4 mg on 5/13/2017  8:14 AM   Next Dose Due:  5/14, morning

## 2017-05-11 ENCOUNTER — SURGERY (OUTPATIENT)
Age: 82
End: 2017-05-11

## 2017-05-11 ENCOUNTER — APPOINTMENT (OUTPATIENT)
Dept: PHYSICAL THERAPY | Facility: CLINIC | Age: 82
DRG: 378 | End: 2017-05-11
Attending: INTERNAL MEDICINE
Payer: MEDICARE

## 2017-05-11 ENCOUNTER — APPOINTMENT (OUTPATIENT)
Dept: CARDIOLOGY | Facility: CLINIC | Age: 82
DRG: 378 | End: 2017-05-11
Attending: INTERNAL MEDICINE
Payer: MEDICARE

## 2017-05-11 PROBLEM — K92.1 MELENA: Status: ACTIVE | Noted: 2017-05-11

## 2017-05-11 LAB
ABO + RH BLD: NORMAL
ABO + RH BLD: NORMAL
ALBUMIN UR-MCNC: NEGATIVE MG/DL
APPEARANCE UR: CLEAR
BILIRUB UR QL STRIP: NEGATIVE
BLD GP AB SCN SERPL QL: NORMAL
BLD PROD TYP BPU: NORMAL
BLD UNIT ID BPU: 0
BLD UNIT ID BPU: 0
BLOOD BANK CMNT PATIENT-IMP: NORMAL
BLOOD PRODUCT CODE: NORMAL
BLOOD PRODUCT CODE: NORMAL
BPU ID: NORMAL
BPU ID: NORMAL
COLOR UR AUTO: YELLOW
ERYTHROCYTE [DISTWIDTH] IN BLOOD BY AUTOMATED COUNT: 12.6 % (ref 10–15)
GLUCOSE BLDC GLUCOMTR-MCNC: 102 MG/DL (ref 70–99)
GLUCOSE BLDC GLUCOMTR-MCNC: 116 MG/DL (ref 70–99)
GLUCOSE BLDC GLUCOMTR-MCNC: 118 MG/DL (ref 70–99)
GLUCOSE BLDC GLUCOMTR-MCNC: 73 MG/DL (ref 70–99)
GLUCOSE BLDC GLUCOMTR-MCNC: 78 MG/DL (ref 70–99)
GLUCOSE BLDC GLUCOMTR-MCNC: 84 MG/DL (ref 70–99)
GLUCOSE BLDC GLUCOMTR-MCNC: 92 MG/DL (ref 70–99)
GLUCOSE BLDC GLUCOMTR-MCNC: 96 MG/DL (ref 70–99)
GLUCOSE UR STRIP-MCNC: NEGATIVE MG/DL
HCT VFR BLD AUTO: 19.8 % (ref 35–47)
HGB BLD-MCNC: 6.3 G/DL (ref 11.7–15.7)
HGB BLD-MCNC: 9.8 G/DL (ref 11.7–15.7)
HGB UR QL STRIP: NEGATIVE
INTERPRETATION ECG - MUSE: NORMAL
KETONES UR STRIP-MCNC: NEGATIVE MG/DL
LEUKOCYTE ESTERASE UR QL STRIP: NEGATIVE
MCH RBC QN AUTO: 30 PG (ref 26.5–33)
MCHC RBC AUTO-ENTMCNC: 31.8 G/DL (ref 31.5–36.5)
MCV RBC AUTO: 94 FL (ref 78–100)
MUCOUS THREADS #/AREA URNS LPF: PRESENT /LPF
NITRATE UR QL: NEGATIVE
NUM BPU REQUESTED: 2
PH UR STRIP: 5 PH (ref 5–7)
PLATELET # BLD AUTO: 128 10E9/L (ref 150–450)
RBC # BLD AUTO: 2.1 10E12/L (ref 3.8–5.2)
RBC #/AREA URNS AUTO: <1 /HPF (ref 0–2)
SP GR UR STRIP: 1.01 (ref 1–1.03)
SPECIMEN EXP DATE BLD: NORMAL
SQUAMOUS #/AREA URNS AUTO: <1 /HPF (ref 0–1)
TRANSFUSION STATUS PATIENT QL: NORMAL
UPPER GI ENDOSCOPY: NORMAL
URN SPEC COLLECT METH UR: ABNORMAL
UROBILINOGEN UR STRIP-MCNC: 0 MG/DL (ref 0–2)
WBC # BLD AUTO: 7.5 10E9/L (ref 4–11)
WBC #/AREA URNS AUTO: 2 /HPF (ref 0–2)

## 2017-05-11 PROCEDURE — 25000132 ZZH RX MED GY IP 250 OP 250 PS 637: Mod: GY | Performed by: INTERNAL MEDICINE

## 2017-05-11 PROCEDURE — 93306 TTE W/DOPPLER COMPLETE: CPT | Mod: 26 | Performed by: INTERNAL MEDICINE

## 2017-05-11 PROCEDURE — S0164 INJECTION PANTROPRAZOLE: HCPCS | Performed by: PHYSICIAN ASSISTANT

## 2017-05-11 PROCEDURE — 25000128 H RX IP 250 OP 636: Performed by: HOSPITALIST

## 2017-05-11 PROCEDURE — 40000193 ZZH STATISTIC PT WARD VISIT: Performed by: PHYSICAL THERAPIST

## 2017-05-11 PROCEDURE — 93306 TTE W/DOPPLER COMPLETE: CPT

## 2017-05-11 PROCEDURE — 36415 COLL VENOUS BLD VENIPUNCTURE: CPT | Performed by: INTERNAL MEDICINE

## 2017-05-11 PROCEDURE — 97161 PT EVAL LOW COMPLEX 20 MIN: CPT | Mod: GP | Performed by: PHYSICAL THERAPIST

## 2017-05-11 PROCEDURE — 85018 HEMOGLOBIN: CPT | Performed by: INTERNAL MEDICINE

## 2017-05-11 PROCEDURE — 0DJ08ZZ INSPECTION OF UPPER INTESTINAL TRACT, VIA NATURAL OR ARTIFICIAL OPENING ENDOSCOPIC: ICD-10-PCS | Performed by: INTERNAL MEDICINE

## 2017-05-11 PROCEDURE — P9016 RBC LEUKOCYTES REDUCED: HCPCS | Performed by: EMERGENCY MEDICINE

## 2017-05-11 PROCEDURE — 25000128 H RX IP 250 OP 636: Performed by: INTERNAL MEDICINE

## 2017-05-11 PROCEDURE — 12000007 ZZH R&B INTERMEDIATE

## 2017-05-11 PROCEDURE — 25000125 ZZHC RX 250: Performed by: INTERNAL MEDICINE

## 2017-05-11 PROCEDURE — 43235 EGD DIAGNOSTIC BRUSH WASH: CPT | Performed by: INTERNAL MEDICINE

## 2017-05-11 PROCEDURE — 97116 GAIT TRAINING THERAPY: CPT | Mod: GP | Performed by: PHYSICAL THERAPIST

## 2017-05-11 PROCEDURE — 25000125 ZZHC RX 250: Performed by: PHYSICIAN ASSISTANT

## 2017-05-11 PROCEDURE — A9270 NON-COVERED ITEM OR SERVICE: HCPCS | Mod: GY | Performed by: INTERNAL MEDICINE

## 2017-05-11 PROCEDURE — 81001 URINALYSIS AUTO W/SCOPE: CPT | Performed by: EMERGENCY MEDICINE

## 2017-05-11 PROCEDURE — 85027 COMPLETE CBC AUTOMATED: CPT | Performed by: INTERNAL MEDICINE

## 2017-05-11 PROCEDURE — 00000146 ZZHCL STATISTIC GLUCOSE BY METER IP

## 2017-05-11 PROCEDURE — S0164 INJECTION PANTROPRAZOLE: HCPCS | Performed by: INTERNAL MEDICINE

## 2017-05-11 PROCEDURE — 40000104 ZZH STATISTIC MODERATE SEDATION < 10 MIN: Performed by: INTERNAL MEDICINE

## 2017-05-11 PROCEDURE — 99232 SBSQ HOSP IP/OBS MODERATE 35: CPT | Performed by: INTERNAL MEDICINE

## 2017-05-11 RX ORDER — ACETAMINOPHEN 500 MG
1000 TABLET ORAL 3 TIMES DAILY PRN
Status: DISCONTINUED | OUTPATIENT
Start: 2017-05-11 | End: 2017-05-13 | Stop reason: HOSPADM

## 2017-05-11 RX ORDER — HYDROCHLOROTHIAZIDE 25 MG/1
25 TABLET ORAL EVERY MORNING
Status: DISCONTINUED | OUTPATIENT
Start: 2017-05-11 | End: 2017-05-13 | Stop reason: HOSPADM

## 2017-05-11 RX ORDER — NICOTINE POLACRILEX 4 MG
15-30 LOZENGE BUCCAL
Status: DISCONTINUED | OUTPATIENT
Start: 2017-05-11 | End: 2017-05-13 | Stop reason: HOSPADM

## 2017-05-11 RX ORDER — FENTANYL CITRATE 50 UG/ML
INJECTION, SOLUTION INTRAMUSCULAR; INTRAVENOUS PRN
Status: DISCONTINUED | OUTPATIENT
Start: 2017-05-11 | End: 2017-05-11 | Stop reason: HOSPADM

## 2017-05-11 RX ORDER — ACETAMINOPHEN 325 MG/1
650 TABLET ORAL EVERY 4 HOURS PRN
Status: DISCONTINUED | OUTPATIENT
Start: 2017-05-11 | End: 2017-05-11

## 2017-05-11 RX ORDER — PANTOPRAZOLE SODIUM 40 MG/10ML
40 INJECTION, POWDER, LYOPHILIZED, FOR SOLUTION INTRAVENOUS 2 TIMES DAILY
Status: DISCONTINUED | OUTPATIENT
Start: 2017-05-11 | End: 2017-05-11

## 2017-05-11 RX ORDER — PREDNISONE 1 MG/1
4 TABLET ORAL EVERY MORNING
Status: DISCONTINUED | OUTPATIENT
Start: 2017-05-11 | End: 2017-05-13 | Stop reason: HOSPADM

## 2017-05-11 RX ORDER — ONDANSETRON 4 MG/1
4 TABLET, ORALLY DISINTEGRATING ORAL EVERY 6 HOURS PRN
Status: DISCONTINUED | OUTPATIENT
Start: 2017-05-11 | End: 2017-05-13 | Stop reason: HOSPADM

## 2017-05-11 RX ORDER — POTASSIUM CHLORIDE 1500 MG/1
20 TABLET, EXTENDED RELEASE ORAL 2 TIMES DAILY
Status: DISCONTINUED | OUTPATIENT
Start: 2017-05-11 | End: 2017-05-13 | Stop reason: HOSPADM

## 2017-05-11 RX ORDER — METOPROLOL TARTRATE 50 MG
50 TABLET ORAL 2 TIMES DAILY
Status: DISCONTINUED | OUTPATIENT
Start: 2017-05-11 | End: 2017-05-13 | Stop reason: HOSPADM

## 2017-05-11 RX ORDER — LISINOPRIL 5 MG/1
5 TABLET ORAL DAILY
Status: DISCONTINUED | OUTPATIENT
Start: 2017-05-11 | End: 2017-05-11

## 2017-05-11 RX ORDER — LIDOCAINE 40 MG/G
CREAM TOPICAL
Status: DISCONTINUED | OUTPATIENT
Start: 2017-05-11 | End: 2017-05-11 | Stop reason: HOSPADM

## 2017-05-11 RX ORDER — LISINOPRIL 10 MG/1
10 TABLET ORAL DAILY
Status: DISCONTINUED | OUTPATIENT
Start: 2017-05-11 | End: 2017-05-12

## 2017-05-11 RX ORDER — ONDANSETRON 2 MG/ML
4 INJECTION INTRAMUSCULAR; INTRAVENOUS EVERY 6 HOURS PRN
Status: DISCONTINUED | OUTPATIENT
Start: 2017-05-11 | End: 2017-05-13 | Stop reason: HOSPADM

## 2017-05-11 RX ORDER — NALOXONE HYDROCHLORIDE 0.4 MG/ML
.1-.4 INJECTION, SOLUTION INTRAMUSCULAR; INTRAVENOUS; SUBCUTANEOUS
Status: DISCONTINUED | OUTPATIENT
Start: 2017-05-11 | End: 2017-05-13 | Stop reason: HOSPADM

## 2017-05-11 RX ORDER — SODIUM CHLORIDE AND POTASSIUM CHLORIDE 150; 900 MG/100ML; MG/100ML
INJECTION, SOLUTION INTRAVENOUS CONTINUOUS
Status: DISCONTINUED | OUTPATIENT
Start: 2017-05-11 | End: 2017-05-13 | Stop reason: HOSPADM

## 2017-05-11 RX ORDER — DEXTROSE MONOHYDRATE 25 G/50ML
25-50 INJECTION, SOLUTION INTRAVENOUS
Status: DISCONTINUED | OUTPATIENT
Start: 2017-05-11 | End: 2017-05-13 | Stop reason: HOSPADM

## 2017-05-11 RX ADMIN — METOPROLOL TARTRATE 50 MG: 50 TABLET ORAL at 20:38

## 2017-05-11 RX ADMIN — METOPROLOL TARTRATE 50 MG: 50 TABLET ORAL at 01:40

## 2017-05-11 RX ADMIN — PANTOPRAZOLE SODIUM 40 MG: 40 INJECTION, POWDER, FOR SOLUTION INTRAVENOUS at 09:41

## 2017-05-11 RX ADMIN — BENZOCAINE 1 APPLICATOR: 220 SPRAY, METERED PERIODONTAL at 13:23

## 2017-05-11 RX ADMIN — SODIUM CHLORIDE 8 MG/HR: 9 INJECTION, SOLUTION INTRAVENOUS at 14:49

## 2017-05-11 RX ADMIN — MIDAZOLAM HYDROCHLORIDE 1 MG: 1 INJECTION, SOLUTION INTRAMUSCULAR; INTRAVENOUS at 13:24

## 2017-05-11 RX ADMIN — POTASSIUM CHLORIDE 20 MEQ: 1500 TABLET, EXTENDED RELEASE ORAL at 20:38

## 2017-05-11 RX ADMIN — FENTANYL CITRATE 50 MCG: 50 INJECTION, SOLUTION INTRAMUSCULAR; INTRAVENOUS at 13:24

## 2017-05-11 RX ADMIN — METOPROLOL TARTRATE 50 MG: 50 TABLET ORAL at 08:45

## 2017-05-11 RX ADMIN — POTASSIUM CHLORIDE AND SODIUM CHLORIDE: 900; 150 INJECTION, SOLUTION INTRAVENOUS at 22:39

## 2017-05-11 RX ADMIN — SODIUM CHLORIDE 500 ML: 9 INJECTION, SOLUTION INTRAVENOUS at 03:48

## 2017-05-11 RX ADMIN — LISINOPRIL 10 MG: 10 TABLET ORAL at 16:50

## 2017-05-11 RX ADMIN — POTASSIUM CHLORIDE AND SODIUM CHLORIDE: 900; 150 INJECTION, SOLUTION INTRAVENOUS at 01:30

## 2017-05-11 ASSESSMENT — ACTIVITIES OF DAILY LIVING (ADL)
COMMUNICATION: 0-->UNDERSTANDS/COMMUNICATES WITHOUT DIFFICULTY
DRESS: 0-->INDEPENDENT
RETIRED_COMMUNICATION: 0-->UNDERSTANDS/COMMUNICATES WITHOUT DIFFICULTY
TRANSFERRING: 0-->INDEPENDENT
EATING: 0-->INDEPENDENT
NUMBER_OF_TIMES_PATIENT_HAS_FALLEN_WITHIN_LAST_SIX_MONTHS: 2
FALL_HISTORY_WITHIN_LAST_SIX_MONTHS: YES
TOILETING: 0-->INDEPENDENT
TOILETING: 0-->INDEPENDENT
AMBULATION: 0-->INDEPENDENT
BATHING: 0-->INDEPENDENT
COGNITION: 1 - ATTENTION OR MEMORY DEFICITS
BATHING: 0-->INDEPENDENT
SWALLOWING: 0-->SWALLOWS FOODS/LIQUIDS WITHOUT DIFFICULTY
CURRENT_FUNCTIONAL_LEVEL_COMMENT: SBA
AMBULATION: 2-->ASSISTIVE PERSON
CHANGE_IN_FUNCTIONAL_STATUS_SINCE_ONSET_OF_CURRENT_ILLNESS/INJURY: YES
RETIRED_EATING: 0-->INDEPENDENT
TRANSFERRING: 0-->INDEPENDENT
DRESS: 0-->INDEPENDENT
SWALLOWING: 0-->SWALLOWS FOODS/LIQUIDS WITHOUT DIFFICULTY

## 2017-05-11 NOTE — OR NURSING
Patient tolerated the procedure well. 3 liters oxygen placed for the procedure. Report called to patient primary RN, patient in recovery in a stable condition.

## 2017-05-11 NOTE — PROGRESS NOTES
[facility]  Hospitalist Progress Note    Name: Araseli Pavon    MRN: [id number]  Provider:  Robbie Garcia MD, [facility]    Date of Service: 05/11/2017     Reason for Stay (Diagnosis): GI bleed          Summary of hospital stay & Assessment/Plan:   Summary of Stay: Araseli Pavon is a 82 year old female who was admitted on 5/10/2017  82-year-old woman who presents with persistent lightheadedness, fall, possible melena, and acute on chronic anemia. She has a history of coronary artery disease, hypertension, diabetes, and polymyalgia rheumatica.  Hemoglobin dropped down to 6.3, she received 2 units of blood transfusion.  She was scoped by gastroenterology and was found to have duodenal ulcer was significant inflammation    Problem List:   1. Upper GI bleeding from a duodenal ulcer likely combination of prednisone and aspirin  2. Coronary artery disease stable  3. Diabetes mellitus  4. Polymyalgia rheumatica  5. Hypertension    Continue Protonix drip today per gastroenterology  Change to IV twice a day Protonix, needs at least 48 hours of IV therapy per gastroenterology  She will need aspirin for heart disease, this can be resumed on discharge whether she needs to take it with food and avoid any other nonsteroid anti-inflammatory  Follow-up with gastroenterology on H. pylori antigen and to repeat EGD in 2 months  Increase lisinopril from 5-10 mg daily for her blood pressure control  Keep Lantus on hold until she has normal oral intake      DVT Prophylaxis: Pneumatic Compression Devices  Code Status:  DNR/DNI  Discharge Dispo: Likely home, lives alone  Estimated # of Days until Disch: 2-3 days, according to gastroenterology she will need at least 2 more days of IV Protonix        Interval History:         Denies complaints of chest pain or shortness of breath and no further bleeding    Case discussed with Patient nurse                Physical Exam:   Physical Exam   Temp: 99.7  F (37.6  C) Temp src:  Oral BP: 164/66 Pulse: 67 Heart Rate: 65 Resp: 16 SpO2: 96 % O2 Device: None (Room air) Oxygen Delivery: 3 LPM  Vitals:    05/10/17 1827 05/11/17 0305   Weight: 58.5 kg (129 lb) 59.1 kg (130 lb 4.8 oz)     I/O last 3 completed shifts:  In: 1433 [I.V.:1433]  Out: 900 [Urine:900]        GENERAL:  Comfortable.   PSYCH: pleasant, oriented, No acute distress.  EYES: PERRLA, Normal conjunctiva.  HEART:  Normal S1, S2 with no edema.  LUNGS:  Clear to auscultation, normal Respiratory effort.  ABDOMEN:  Soft, no hepatosplenomegaly, normal bowel sounds.  SKIN:  Dry to touch, No rash.  Neuro: Non focal with normal motor power, sensation, CN's and Reflexes.    Medications     0.9% sodium chloride + KCl 20 mEq/L 75 mL/hr at 05/11/17 0130     pantoprazole (PROTONIX) infusion ADULT/PEDS GREATER than or EQUAL to 45 kg 8 mg/hr (05/11/17 1449)       hydrochlorothiazide  25 mg Oral QAM     lisinopril  5 mg Oral Daily     metoprolol  50 mg Oral BID     potassium chloride SA  20 mEq Oral BID     predniSONE  4 mg Oral QAM     insulin aspart  1-10 Units Subcutaneous TID AC     insulin aspart  1-7 Units Subcutaneous At Bedtime     sodium chloride (PF)  3 mL Intracatheter Q8H     Data     -Data reviewed today:  I personally reviewed  all new labs and imaging results over the last 24 hours.      Recent Labs  Lab 05/11/17  0400 05/10/17  2140 05/07/17  0948   WBC 7.5 8.3 5.3   HGB 6.3* 8.0* 10.4*   HCT 19.8* 24.7* 31.9*   MCV 94 93 92   * 159 176       Recent Labs  Lab 05/10/17  2140 05/07/17  0948    143   POTASSIUM 4.2 4.0   CHLORIDE 106 108   CO2 27 30   ANIONGAP 6 5   * 118*   BUN 37* 28   CR 1.14* 1.25*   GFRESTIMATED 46* 41*   GFRESTBLACK 55* 50*   CHRISTIANA 9.0 8.9       No results found for this or any previous visit (from the past 24 hour(s)).    This document was produced using voice recognition software

## 2017-05-11 NOTE — ED NOTES
"[facility]  ED Nurse Handoff Report    Araseli Pavon is a 82 year old female   ED Chief complaint: Dizziness  . ED Diagnosis:   Final diagnoses:   Anemia, unspecified type   Melena   Dizziness   Recurrent falls     Allergies:   Allergies   Allergen Reactions     Codeine Sulfate      Sulfa Drugs        Code Status: Full Code  Activity level - Baseline/Home:  Independent. Activity Level - Current:   Stand with Assist. Lift room needed: No. Bariatric: No   Needed: No   Isolation: No. Infection: Not Applicable.     Vital Signs:   Vitals:    05/10/17 1827 05/10/17 2130 05/10/17 2220   BP: 118/64  (!) 155/92   Pulse: 73     Resp: 18     Temp: 97.1  F (36.2  C)     TempSrc: Temporal     SpO2: 100% 99% 98%   Weight: 58.5 kg (129 lb)     Height: 1.626 m (5' 4\")       Cardiac Rhythm:  ,      Pain level:    Patient confused: No. Patient Falls Risk: Yes.   Elimination Status: Has voided   Patient Report - Initial Complaint: Dizziness. Focused Assessment: Pt arrives with Dizziness that has been going on since Sunday. Pt states she had a fall on Sunday, that resulted in her hitting her head. Pt states she was seen in the ED on Sunday and had a CT scan performed that looked good. Since then, she had a another fall today that has caused her some pain in her right arm. Pt was also started on a antibiotic Sunday for a possible UTI (Keflex). Pt states having intermittent nausea/vomiting and that the dizziness increases with standing. Pt states noticing that her stools have been darker than normal as well, and has felt weaker than normal.   Tests Performed: Lab, EKG, and occult stool Abnormal Results: Occult stool positive for blood. Glucose 122, Creatinine 1.14  Treatments provided: Bolus of NS, meclizine, Protonix, and tylenol  Family Comments: Friend at bedside  OBS brochure/video discussed/provided to patient:  No  ED Medications:   Medications   lidocaine 1 % 1 mL (not administered)   lidocaine (LMX4) " kit (not administered)   sodium chloride (PF) 0.9% PF flush 3 mL (not administered)   sodium chloride (PF) 0.9% PF flush 3 mL (not administered)   0.9% sodium chloride BOLUS (1,000 mLs Intravenous New Bag 5/10/17 9970)     Followed by   0.9% sodium chloride infusion (not administered)   pantoprazole (PROTONIX) 40 mg IV push injection (40 mg Intravenous Given 5/10/17 2215)   meclizine (ANTIVERT) tablet 25 mg (25 mg Oral Given 5/10/17 2215)   acetaminophen (TYLENOL) tablet 1,000 mg (1,000 mg Oral Given 5/10/17 4410)     Drips infusing:  No     ED Nurse Name/Phone Number: Brigid Lese,   11:21 PM  RECEIVING UNIT ED HANDOFF REVIEW    Above ED Nurse Handoff Report was reviewed: Yes  Reviewed by: Annelise Luna on May 10, 2017 at 11:45 PM

## 2017-05-11 NOTE — CONSULTS
GASTROENTEROLOGY CONSULTATION      Araseli Pavon  [address] [facility] [address]  82 year old female     Admission Date/Time: 5/10/2017  Primary Care Provider: Willam Calderon  Referring / Attending Physician:  Harry Zacarias MD     We were asked to see the patient in consultation by Dr. Zacarias for evaluation of melena        HPI:  Araseli Pavon is a 82 year old female with medical history of coronary artery disease, type 2 diabetes mellitus, hypertension, CKD (solitary kidney), and polymyalgia rheumatica who presented to the emergency room with lightheadedness and dizziness found to be anemic with melena.    The patient is a good historian.  She reports about one month of progressive fatigue.  Last week she had become more lightheaded and dizzy.  She did fall on two occasions at home.  She was diagnosed with a urinary tract infection and given Keflex.  The Keflex caused her to have nausea and vomiting so this was stopped after a urine culture was negative.  During the course of her symptoms she did notice black colored stool.  She thought it was secondary to her diet as she was eating prunes.  She believes she been having dark stools for at least a week.  She denies any abdominal pain, hematochezia, or diarrhea.  At baseline she tends to be constipated.  Occasionally she will experience heartburn.  At those times she'll take as needed over-the-counter antacids.  She does not use any NSAIDs except for 81 mg aspirin.  It appears that she uses 4 mg of prednisone daily for PMR.  No previous history of peptic ulcer disease.    In the emergency room her hemoglobin was found to be 10.  This then dropped to 8 and a further 2 g this morning at 6.3.  She last passed melena last evening.  He is currently receiving two units of packed red cells.  INR is normal.  The patient's last colonoscopy per her report was in 2006.  This was completed at [facility].  I'm unable to view the report in care  everywhere.      PAST MEDICAL HISTORY:  Patient Active Problem List    Diagnosis Date Noted     Melena 05/11/2017     Priority: Medium     Chest pain 09/08/2016     Priority: Medium     Hyperglycemia 03/22/2013     Priority: Medium     Diagnosis updated by automated process. Provider to review and confirm.            ROS: A comprehensive ten point review of systems was negative aside from those in mentioned in the HPI.       MEDICATIONS:   Prior to Admission medications    Medication Sig Start Date End Date Taking? Authorizing Provider   cephALEXin (KEFLEX) 500 MG capsule Take 1 capsule (500 mg) by mouth 4 times daily for 7 days 5/7/17 5/14/17  Melecio Croft MD   insulin glargine (LANTUS) 100 UNIT/ML PEN Inject 10 Units Subcutaneous every morning 9/9/16   Tiki Martinez MD   metoprolol (LOPRESSOR) 50 MG tablet Take 1 tablet (50 mg) by mouth 2 times daily 9/9/16   Tiki Martinez MD   predniSONE (DELTASONE) 1 MG tablet Take 4 mg by mouth every morning    Unknown, Entered By History   aspirin 81 MG EC tablet Take 81 mg by mouth At Bedtime    Unknown, Entered By History   lisinopril (PRINIVIL,ZESTRIL) 5 MG tablet Take 5 mg by mouth daily    Unknown, Entered By History   hydrochlorothiazide (HYDRODIURIL) 25 MG tablet Take 25 mg by mouth every morning     Unknown, Entered By History   polyethylene glycol (MIRALAX/GLYCOLAX) powder Take 0.25 capfuls by mouth daily as needed for constipation    Unknown, Entered By History   acetaminophen (TYLENOL) 500 MG tablet Take 1,000 mg by mouth 3 times daily as needed for mild pain    Unknown, Entered By History   potassium chloride SA (K-DUR,KLOR-CON M) 20 MEQ tablet Take 20 mEq by mouth 2 times daily    Unknown, Entered By History   ORDER FOR DME Equipment being ordered: glucometer 3/24/13   Gavin Portillo MD   [employer] FINEPOINT LANCETS MISC Use to test blood sugars 3 times daily or as directed. 3/24/13   Gavin Portillo MD   Glucose Blood (ONE TOUCH ULTRA TEST)  "strip Use to test blood sugars 3 times daily or as directed. 3/24/13   Gavin Portillo MD   Rosuvastatin Calcium (CRESTOR PO) Take 40 mg by mouth At Bedtime     Reported, Patient   Omega-3 Fatty Acids (OMEGA-3 FISH OIL PO) Take 1 g by mouth At Bedtime     Unknown, Entered By History        ALLERGIES:   Allergies   Allergen Reactions     Codeine Sulfate      Sulfa Drugs         SOCIAL HISTORY:  Social History   Substance Use Topics     Smoking status: Not on file     Smokeless tobacco: Not on file     Alcohol use No        FAMILY HISTORY: Noncontributory       PHYSICAL EXAM:   General: No distress  /62  Pulse 66  Temp 98.6  F (37  C) (Oral)  Resp 16  Ht 1.626 m (5' 4\")  Wt 59.1 kg (130 lb 4.8 oz)  SpO2 98%  BMI 22.37 kg/m2     PHYSICAL EXAM:  SKIN: no suspicious lesions, rashes, jaundice  HEAD: Normocephalic. Atraumatic.  NECK: Neck supple. No adenopathy.   EYES: No scleral icterus  RESPIRATORY: Good transmission. CTA bilaterally.   CARDIOVASCULAR: RRR, normal S1, S2,  No murmur appreciated  GASTROINTESTINAL: +BS, soft, non tender, non distended, no guarding/rebound  NEURO: CN 2-12 grossly intact, no focal deficits  PSYCH: Normal affect       ADDITIONAL COMMENTS:   I reviewed the patient's new clinical lab test results.   Recent Labs   Lab Test  05/11/17   0400  05/10/17   2140  05/07/17   0948   09/08/16   1152   WBC  7.5  8.3  5.3   < >  9.4   HGB  6.3*  8.0*  10.4*   < >  11.1*   MCV  94  93  92   < >  92   PLT  128*  159  176   < >  196   INR   --    --   0.97   --   1.01    < > = values in this interval not displayed.     Recent Labs   Lab Test  05/10/17   2140  05/07/17   0948  09/09/16   0533   POTASSIUM  4.2  4.0  3.9   CHLORIDE  106  108  107   CO2  27  30  28   BUN  37*  28  32*   ANIONGAP  6  5  5     Recent Labs   Lab Test  05/11/17   0058  05/07/17   1103  03/22/13   2020   PROTEIN  Negative  30*  30*        CONSULTATION ASSESSMENT AND PLAN:    Araseli Pavon is a 82 year old with " medical history of coronary artery disease, type 2 diabetes mellitus, hypertension, CKD (solitary kidney), and polymyalgia rheumatica who presented to the emergency room with lightheadedness and dizziness found to be anemic with melena.    1. Melena : Patient presenting with lightheadedness, found to have melanotic stool and a positive occult blood.  Hemoglobin has dropped approximately 4 g since admission with melena last evening.  Patient has no abdominal pain.  She uses both 81 mg aspirin and prednisone on a daily basis.  She is not on a proton pump inhibitor.  Suspected ulcer and/or esophagitis/gastritis/duodenitis.  - Plan for upper endoscopy this afternoon.  - IV PPI   - Keep n.p.o.  - IV fluids.  Transfusions per medicine team    2.  Acute on chronic anemia: Baseline hemoglobin appears to be between 10 and 11.  MCV is 94.  She has dropped approximately 4 g since admission.  She is receiving two units of packed red cells currently.  Continue to monitor serial hemoglobin.  Plan as above      I discussed the patient plan with Dr. Hines. Thank you for asking us to participate in the care of this patient.    Christine Brush PA-C  [facility]

## 2017-05-11 NOTE — PHARMACY-ADMISSION MEDICATION HISTORY
Admission medication history interview status for this patient is complete. See [facility] admission navigator for allergy information, prior to admission medications and immunization status.     Medication history interview source(s):Patient  Medication history resources (including written lists, pill bottles, clinic record):None    Changes made to PTA medication list:  Added: none  Deleted: none  Changed: insulin Lantus from 10 units qam to 10 units bid.    Actions taken by pharmacist (provider contacted, etc):None     Additional medication history information:None    Medication reconciliation/reorder completed by provider prior to medication history? Yes    Prior to Admission medications    Medication Sig Last Dose Taking? Auth Provider   insulin glargine (LANTUS) 100 UNIT/ML injection Inject 10 Units Subcutaneous 2 times daily 5/10/2017 at am Yes Reported, Patient   cephALEXin (KEFLEX) 500 MG capsule Take 1 capsule (500 mg) by mouth 4 times daily for 7 days 5/10/2017 at am Yes Melecio Croft MD   metoprolol (LOPRESSOR) 50 MG tablet Take 1 tablet (50 mg) by mouth 2 times daily 5/10/2017 at am Yes Tiki Martinez MD   predniSONE (DELTASONE) 1 MG tablet Take 4 mg by mouth every morning 5/10/2017 Yes Unknown, Entered By History   aspirin 81 MG EC tablet Take 81 mg by mouth At Bedtime 5/9/2017 Yes Unknown, Entered By History   lisinopril (PRINIVIL,ZESTRIL) 5 MG tablet Take 5 mg by mouth daily 5/10/2017 Yes Unknown, Entered By History   hydrochlorothiazide (HYDRODIURIL) 25 MG tablet Take 25 mg by mouth every morning  5/10/2017 Yes Unknown, Entered By History   polyethylene glycol (MIRALAX/GLYCOLAX) powder Take 0.25 capfuls by mouth daily as needed for constipation  Yes Unknown, Entered By History   acetaminophen (TYLENOL) 500 MG tablet Take 1,000 mg by mouth 3 times daily as needed for mild pain  Yes Unknown, Entered By History   potassium chloride SA (K-DUR,KLOR-CON M) 20 MEQ tablet Take 20 mEq by mouth 2 times  daily 5/10/2017 Yes Unknown, Entered By History   Rosuvastatin Calcium (CRESTOR PO) Take 40 mg by mouth At Bedtime  5/9/2017 Yes Reported, Patient   Omega-3 Fatty Acids (OMEGA-3 FISH OIL PO) Take 1 g by mouth At Bedtime  5/9/2017 Yes Unknown, Entered By History   ORDER FOR DME Equipment being ordered: glucometer   Gavin Portillo MD   [employer] FINEPOINT LANCETS MISC Use to test blood sugars 3 times daily or as directed.   Gavin Portillo MD   Glucose Blood (ONE TOUCH ULTRA TEST) strip Use to test blood sugars 3 times daily or as directed.   Gavin Portillo MD

## 2017-05-11 NOTE — ED PROVIDER NOTES
CHIEF COMPLAINT:  Dizziness, fall.      HISTORY OF PRESENT ILLNESS:  Araseli Pavon is an 82-year-old female who presents to the emergency room with a spinning type of dizziness that she has been experiencing since Sunday.  She notes she fell and hit her head on Thursday and had a head CT in our emergency department on Sunday when she was seen because the dizziness was so severe.  Head CT did not show any acute pathology and she is discharged home on Keflex due to possible UTI.  She notes she took the Keflex until the day before yesterday, but stopped it because she was feeling more nausea and discomfort.  She notes ongoing dizziness.  She vomited once on Monday and had diarrhea on Monday and Tuesday she thinks due to the antibiotic.  She denies any syncope, but did fall again today due to the dizziness.  She notes she hit her right arm but denies any other symptoms aside from mild soreness of the arm.  She has been using arm normally.  She did not hit her head or lose consciousness.  She lives home alone and feels like she is unable to do her normal tasks including food preparation.  She did not eat much aside from a potato over the last 2 days.  She denies any fevers or vision changes, confusion, change in urination.  She notes the diarrhea has been black in color.      ALLERGIES:  Codeine, sulfa drugs.      MEDICATIONS:  Carvedilol, prednisone, aspirin, lisinopril, hydrochlorothiazide, polyethylene glycol, Tylenol p.r.n., potassium, rosuvastatin, calcium, Omega 3 fatty acids, Keflex (discontinued yesterday).      PAST MEDICAL HISTORY:  Coronary artery disease, chronic renal failure, DJD, glucose intolerance, high cholesterol, hypertension, polymyalgia rheumatica, acquired solitary kidney, stented coronary artery.      PAST SURGICAL HISTORY:  Appendectomy, cholecystectomy.      FAMILY HISTORY:  Noncontributory.      SOCIAL HISTORY:  The patient is  and lives home alone.  She is a nonsmoker, does not use  alcohol.  She is here with her close friends.      REVIEW OF SYSTEMS:  As noted in history present illness all other systems are reviewed and negative.      PHYSICAL EXAMINATION:   VITAL SIGNS:  Blood pressure is 118/64, pulse is 73, respiratory rate 18, oxygen saturation 100% on room air, temperature is 97.1, temporal.   GENERAL:  Reveals an elderly female sitting upright.   EYES:  Pupils are equal, round, react to light.  Conjunctivae are normal.  No nystagmus.  EOMI.  HEENT:  No scalp tenderness to palpation.  Right TM is partially occluded by earwax.  The visualized portion of the TM is normal.  Same with the left external auditory canal and TM.   ENT:  Moist mucous membranes.  Oropharynx is clear.   CARDIOVASCULAR:  Normal S1, S2.  Regular rate and rhythm.  No murmurs, rubs or gallops.   RESPIRATORY:  Clear to auscultation bilaterally.  No wheezes, rales or rhonchi.   GASTROINTESTINAL:  Abdomen is soft, nontender, nondistended.   RECTAL:  Normal tone.  Small amount of black stool in the rectal vault was soft.  Nontender.   MUSCULOSKELETAL:  Moves all extremities equally.  No extremity deformity.  She has no significant tenderness to palpation of the right upper extremity.   SKIN:  Warm and dry.  No rashes, lesions or ecchymoses.   NEUROLOGIC:  Alert and oriented x3.  No focal neurologic findings.  Responds to questions and commands.  Speech is normal.   PSYCHIATRIC:  Normal affect.  Pleasant woman.        LABORATORY AND DIAGNOSTICS:  A 12-lead EKG shows normal sinus rhythm with sinus arrhythmia, ventricular rate of 79 with no sign of ischemia or injury.  Normal axis and intervals.      CBC with platelets, differential shows a white blood cell count 8.3, red blood cell count decreased at 2.67, hemoglobin decreased at 8.0, compared to 11 5 days prior, hematocrit decreased at 24.7, otherwise within normal limits.      Basic metabolic panel shows glucose elevated at 122, BUN elevated at 37, creatinine elevated at  1.14.  GFR diminished at 55.      Stool occult blood is positive.      Ab0/Rh type and screen pending.        Urinalysis with microscopic analysis pending collection.        INTERVENTIONS:   1.  Protonix 40 mg IV.   2.  Meclizine 25 mg p.o.   3.  Tylenol 1000 mg p.o.   4.  Sodium chloride bolus 1000 mL IV.      EMERGENCY DEPARTMENT COURSE:  The patient was seen and examined by me.      Peripheral IV placed, blood drawn and sent to lab.        The patient was reassessed.  Denies any new concerns.  I discussed the plan to discuss results of testing.  I discussed the plan of admission.  The patient verbalizes understanding and agreed with this plan.  The patient is admitted to an inpatient medical bed in stable condition accepted by Dr. Zacarias of the Hospitalist Service.      MEDICAL DECISION MAKING:  Araseli Pavon is an 82-year-old female who presents to the emergency room with dizziness.  It sounds most consistent with vertigo, resulting in a fall today.  She had a fall that seems to have triggered the initial dizziness and was evaluated for that already with a negative head CT.  Here she has no focal neurologic abnormalities.  No repeat head injury.  She has soreness in the right arm but nothing reproducible on examination to suggest the need for x-ray.  She was noted to be anemic, hemoglobin of 8 compared to 11 when evaluated here 3 days ago.  That in combination with the melanoma makes me suspect that she may be having some slow GI bleeding.  The source of this ultimately is not known.  She was administered Protonix with a type and screen with a need for serial hemoglobins to determine further action.  It is not clear if this is also contributing to her falls and/or dizziness.  I discussed the plan with the patient.  She verbalized understanding and agreement.  All questions were answered prior to admission.      DISPOSITION:  The patient is admitted to Dr. Zacarias of the Hospitalist Service to a medical  bed in stable condition.  All questions were answered prior to discharge.      DIAGNOSES:   1.  Anemia, unspecified type.   2.  Melena.   3.  Dizziness.   4.  Recurrent falls.         NOÉ JACOB MD             D: 05/10/2017 23:15   T: 2017 08:42   MT: [id number]      Name:     YUMIKO BOURGEOIS   MRN:      [id number]        Account:      [id number]   :      1935           Visit Date:   05/10/2017      Document: [id number]

## 2017-05-11 NOTE — PROGRESS NOTES
SPIRITUAL HEALTH SERVICES Progress Note  [facility]  Med. Surg. 5    Attempted to see pt per an admission request for  support.  Upon each attempt pt was either receiving cares or out of the room for a procedure.  Unit  will follow up tomorrow.    Blair Mims M.Div., [facility]  Staff   Pager [phone]

## 2017-05-11 NOTE — DISCHARGE INSTRUCTIONS
The patient has received a copy of the Provation  report the doctor has written and discharge instructions have been discussed with the patient and responsible adult.  All questions were addressed and answered prior to patient discharge.

## 2017-05-11 NOTE — PLAN OF CARE
Problem: Goal Outcome Summary  Goal: Goal Outcome Summary  Outcome: No Change  Orthostatic BP positive, MD notified. Bolus given and STAT hgb 6.3, order received to transfuse 2 units PRBC. Transfusion started. Patient states she has been having dizziness and 2 falls since Sunday. Up to BR with SBA, states dizziness is currently improved. Patient denies any black stools or blood in toilet. No stools since admission. Alert and oriented. Plan Echo and GI consult today.

## 2017-05-11 NOTE — PROCEDURES
PRE-PROCEDURE NOTE    CHIEF COMPLAINT / REASON FOR PROCEDURE:  Anemia, black stool    History and Physical Reviewed:  yes    PRE-SEDATION ASSESSMENT:    Lung Exam:  normal  Heart Exam:  normal  Mallampati Airway Classification:  3   Previous reaction to anesthesia/sedation:   no  Sedation plan based on assessment:  modeate  Comment(s):  none  ASA Classification:  3    IMPRESSION:  Rule out upper GI bleeding    PLAN:  EGD with moderate sedation    Ivone Hines MD

## 2017-05-11 NOTE — PROGRESS NOTES
05/11/17 1559   Quick Adds   Type of Visit Initial PT Evaluation   Living Environment   Lives With alone   Living Arrangements house   Number of Stairs to Enter Home 3  (rail on R)   Number of Stairs Within Home 13  (rail on L)   Transportation Available car  (pt drives)   Living Environment Comment Lives alone, reports her son might be coming from [place] to stay with her a few days upon discharge but not fully sure   Self-Care   Usual Activity Tolerance good   Current Activity Tolerance good   Regular Exercise yes   Activity/Exercise Type (chair [facility] at senior center x1/wk)   Equipment Currently Used at Home none   Functional Level Prior   Ambulation 0-->independent   Transferring 0-->independent   Toileting 0-->independent   Bathing 0-->independent   Dressing 0-->independent   Eating 0-->independent   Communication 0-->understands/communicates without difficulty   Swallowing 0-->swallows foods/liquids without difficulty   Cognition 0 - no cognition issues reported   Fall history within last six months yes   Number of times patient has fallen within last six months 2   Which of the above functional risks had a recent onset or change? ambulation   Prior Functional Level Comment IND with functional mobility and ADLs   General Information   Onset of Illness/Injury or Date of Surgery - Date 05/10/17   Referring Physician Juan Zacarias MD   Patient/Family Goals Statement to go back home   Pertinent History of Current Problem (include personal factors and/or comorbidities that impact the POC) Pt is 82-year-old woman who presents with persistent lightheadedness, fall, possible melena, and acute on chronic anemia. She has a history of coronary artery disease, hypertension, diabetes, and polymyalgia rheumatica.  Hemoglobin dropped down to 6.3, she received 2 units of blood transfusion.  She was scoped by gastroenterology and was found to have duodenal ulcer was significant inflammation   Precautions/Limitations fall  "precautions   General Info Comments Activity: Up with assist    Cognitive Status Examination   Orientation orientation to person, place and time   Level of Consciousness alert   Follows Commands and Answers Questions 100% of the time   Personal Safety and Judgment intact   Memory intact   Pain Assessment   Patient Currently in Pain No   Posture    Posture Forward head position   Range of Motion (ROM)   ROM Comment WFL in BLE   Strength   Strength Comments WFL in BLE as observed through functional mobility   Bed Mobility   Bed Mobility Comments SBA supine <> sit   Transfer Skills   Transfer Comments SBA sit <> stand   Gait   Gait Comments SBA x150' without AD, decreased gait speed noted   Balance   Balance Comments Fair, no overt LOB but occasional path deviation with concern for falls risk   Sensory Examination   Sensory Perception Comments denies N/T   General Therapy Interventions   Planned Therapy Interventions balance training;bed mobility training;gait training;neuromuscular re-education;strengthening;stretching;transfer training;risk factor education;home program guidelines;progressive activity/exercise   Clinical Impression   Criteria for Skilled Therapeutic Intervention yes, treatment indicated   PT Diagnosis decreased functional mobility   Influenced by the following impairments weakness, low Hgb   Functional limitations due to impairments bed mobility, transfers, ambulation, stair climibing   Clinical Presentation Stable/Uncomplicated   Clinical Presentation Rationale improved clincial picture, has been transfused x2 units PRBCs   Clinical Decision Making (Complexity) Low complexity   Therapy Frequency` daily   Predicted Duration of Therapy Intervention (days/wks) 2   Anticipated Discharge Disposition Home   Risk & Benefits of therapy have been explained Yes   Patient, Family & other staff in agreement with plan of care Yes   [facility] AM-PAC TM \"6 Clicks\"   2016, Trustees of [facility], " "under license to [employer].  All rights reserved.   6 Clicks Short Forms Basic Mobility Inpatient Short Form   [facility] AM-PAC  \"6 Clicks\" V.2 Basic Mobility Inpatient Short Form   1. Turning from your back to your side while in a flat bed without using bedrails? 3 - A Little   2. Moving from lying on your back to sitting on the side of a flat bed without using bedrails? 3 - A Little   3. Moving to and from a bed to a chair (including a wheelchair)? 3 - A Little   4. Standing up from a chair using your arms (e.g., wheelchair, or bedside chair)? 3 - A Little   5. To walk in hospital room? 3 - A Little   6. Climbing 3-5 steps with a railing? 3 - A Little   Basic Mobility Raw Score (Score out of 24.Lower scores equate to lower levels of function) 18   Total Evaluation Time   Total Evaluation Time (Minutes) 9     "

## 2017-05-11 NOTE — H&P
CHIEF COMPLAINT:  Fall and lightheadedness.      HISTORY OF PRESENT ILLNESS:  Ms. Araseli Pavon is an 82-year-old woman who was initially seen in the Emergency Department on 05/07.  She had slipped off her bed while reaching for something, and had fallen and hit her head three days previous.  On the day that she came in, she was feeling light-headed.  Workup was unremarkable, but a urinalysis showed 10 white cells.  She was given a prescription for Keflex and discharged to home.  Urine culture later was negative for any bacteria.  The patient reports that after getting home, the following day she was having loose stools and then some vomiting.  She also felt more lightheaded and stopped taking the antibiotic.  She reports she has had some blackish stools recently.  After about one day, the loose stools and vomiting had resolved.  She continues to have some lightheadedness, but it also seems to worsen at times.  It is especially worse when she gets up and walks.  Today, she was again trying to sit down when she was reaching for the bath tub, and fell and hit her right shoulder.  She describes lightheadedness as the sensation of feeling as though she might pass out.  She had a mild headache earlier today which has since resolved.  She also reports that when she is walking, she feels somewhat unsteady at times.  She never had any problems prior to this week.      PAST MEDICAL HISTORY:   1.  Coronary artery disease, with right coronary artery stent placed in 2010.   2.  Hypertension.   3.  Dyslipidemia.   4.  Type 2 diabetes.   5.  Chronic kidney disease.   6.  Polymyalgia rheumatica.   7.  Dyslipidemia.   8.  Solitary kidney.  She had a kidney removed secondary to infection.   9.  Cholecystectomy.   10.  Appendectomy.   11.  Mild anemia, with hemoglobin usually running in the 9-11 range.      MEDICATIONS:   1.  Acetaminophen 1000 mg three times a day.   2.  Aspirin 81 mg a day.   3.  Keflex 500 mg four times a day  (she stopped taking).   4.  Hydrochlorothiazide 25 mg daily.   5.  Lantus insulin 10 units subq q.a.m.   6.  Lisinopril 5 mg a day.   7.  Metoprolol 50 mg twice a day.   8.  Omega-3 fatty acids 1 gram daily.   9.  MiraLax as needed.   10.  Potassium chloride 20 mEq twice a day.   11.  Prednisone 4 mg a day.   12.  Crestor 40 mg at bedtime.      ALLERGIES:  Codeine, sulfa.      FAMILY HISTORY:  Reviewed and noncontributory.      SOCIAL HISTORY:  She does not smoke.  She lives by herself.  A friend brought her into the emergency department.      REVIEW OF SYSTEMS:  She reports she has had difficulty standing up to prepare food, and therefore has not eaten much over the past couple of days.  A 10-point review of systems was performed.  The pertinent positives can be seen in the history of present illness.      PHYSICAL EXAM:   VITAL SIGNS:  Blood pressure 118/64, pulse 73, temperature 97.1, oxygen saturation 99% on room air.   GENERAL:  She is alert, pleasant, cooperative, shivering, otherwise in no distress.   EYES:  Pupils round and equal.  Conjunctivae, sclerae and lids are within normal limits.   NECK:  Supple, with no masses, no thyromegaly.   OROPHARYNX:  Pink and moist.  Teeth and lips are within normal limits.   HEART:  Regular rhythm.  No murmurs.   LUNGS:  Clear to auscultation bilaterally with normal respiratory effort.   ABDOMEN:  Soft, nontender.  There are no masses.  Bowel sounds are active.   EXTREMITIES:  There is no edema.   NEUROLOGIC:  Strength and sensation are intact in all four extremities.  Finger-nose-finger testing is normal.  Cranial nerves II through XII are grossly intact.   PSYCHIATRIC:  Mood and affect appear within normal limits.   SKIN:  No rashes are noted on limited exam.      LABS:  Stool for occult blood is positive.  Sodium 139, potassium 4.2, chloride 106, bicarbonate 27, BUN 37, creatinine 1.1, glucose 122.  White blood cell count 8.3, hemoglobin 8.0, platelets 159,000.       ASSESSMENT AND PLAN:  Ms. Pavon is an 82-year-old woman who presents with persistent lightheadedness, fall, possible melena, and acute on chronic anemia.  She has a history of coronary artery disease, hypertension, diabetes, and polymyalgia rheumatica.   1.  Persistent lightheadedness:  Differential diagnosis includes secondary to volume depletion and possible GI bleeding versus cardiac arrhythmia versus valvular disease vs carotid disease versus other.  We will start the monitoring on telemetry, and will follow her hemoglobin levels.  We will check orthostatic vital signs, and will check an echocardiogram.  We will also further evaluate for GI bleeding.  She will be hydrated with IV fluids.  If the workup is negative and she is having continued symptoms, we could consider evaluation of her carotids, and possibly her intracranial circulation, but this seems likely to be of much lower potential yield.  We will also request Physical Therapy evaluation.   2.  Possible melena, with drop in hemoglobin, and stool positive for occult blood:  I think she may be having some amount of GI bleeding; it is unclear how this is contributing to her current symptoms.  Her hemoglobin is down 2 grams from three days ago.  We will hold her aspirin.  Monitor her hemoglobin.  She will be treated empirically with a proton pump inhibitor, and we will request a Gastroenterology consultation for further evaluation.   3.  Diabetes mellitus:  We will hold her Lantus in the morning, and she will be n.p.o. until seen by Gastroenterology.  She will be treated with sliding scale insulin as needed.   4.  Polymyalgia rheumatica:  Continue with prednisone.   5.  Hypertension:  We will continue with metoprolol and lisinopril.  It is possible that metoprolol could contribute to lightheadedness, but we will need to monitor her vital signs to make a more clear assessment.         ASTER TORRES MD             D: 05/10/2017 23:49   T:  2017 02:22   MT: [id number]      Name:     YUMIKO BOURGEOIS   MRN:      [id number]        Account:      [id number]   :      1935           Admitted:     [id number]      Document: [id number]       cc: Willam Calderon MD

## 2017-05-11 NOTE — PLAN OF CARE
Problem: Goal Outcome Summary  Goal: Goal Outcome Summary  PT: Orders received, eval completed, treatment initiated.  Pt admitted with GI bleed; pt lives alone in [facility]; IND with functional mobility and ADLs including driving.  Currently, pt SBA supine <> sit, SBA sit <> stand, SBA x140' without AD needing occasional CGA 2/2 lateral path deviation.  Pt declined stair trial this date.     Anticipate x1 more PT session to assess IND with functional mobility and to assess stairs prior to recommendation to safe discharge home once medically stable.

## 2017-05-11 NOTE — PLAN OF CARE
Problem: Goal Outcome Summary  Goal: Goal Outcome Summary  Outcome: No Change  Pt remains hospitalized for upper GI bleed. EGD completed today/duodenal ulcer found. 2 units PRBC's given for hgb 6.3. Plan to transition to protonix IV drip. Pt NPO this shift, no BM. Denies pain, denies nausea. BG WDL. VSS. Ambulating with SBA.

## 2017-05-11 NOTE — PROCEDURES
GI Brief EGD note - see full note under Procedures tab    EGD completed with 1mg versed, 50 mcg fentanyl.    Findings:  - normal esophagus  - large hiatal hernia otherwise normal stomach  - ulcer and severe inflammation in the duodenal bulb, small clot washed off, no visible vessel seen  - more distal duodenum appeared normal    A/P:  Duodenal ulcer likely from ASA/Prednisone.    - change to IV PPI infusion for today, back to IV bid dosing tomorrow  - clear liquids only today  - at discharge (would not discharge for at least 48 hours IV therapy - patient lives alone) Omeprazole 40mg BID  - if ASA needed daily and prednisone, would make sure it is taken with meals  - No NSAIDs for pain  -given degree of inflammation I would repeat EGD in 2 months  - check stool h. Pylori ag    Ivone Hines MD  [facility]  Cell/Pager: [phone]  After 5pm please call [phone]

## 2017-05-12 ENCOUNTER — APPOINTMENT (OUTPATIENT)
Dept: PHYSICAL THERAPY | Facility: CLINIC | Age: 82
DRG: 378 | End: 2017-05-12
Payer: MEDICARE

## 2017-05-12 LAB
CREAT SERPL-MCNC: 1.08 MG/DL (ref 0.52–1.04)
GFR SERPL CREATININE-BSD FRML MDRD: 49 ML/MIN/1.7M2
GLUCOSE BLDC GLUCOMTR-MCNC: 100 MG/DL (ref 70–99)
GLUCOSE BLDC GLUCOMTR-MCNC: 104 MG/DL (ref 70–99)
GLUCOSE BLDC GLUCOMTR-MCNC: 137 MG/DL (ref 70–99)
GLUCOSE BLDC GLUCOMTR-MCNC: 145 MG/DL (ref 70–99)
GLUCOSE BLDC GLUCOMTR-MCNC: 175 MG/DL (ref 70–99)
GLUCOSE BLDC GLUCOMTR-MCNC: 96 MG/DL (ref 70–99)
HGB BLD-MCNC: 9.2 G/DL (ref 11.7–15.7)
MAGNESIUM SERPL-MCNC: 2 MG/DL (ref 1.6–2.3)
PLATELET # BLD AUTO: 114 10E9/L (ref 150–450)
POTASSIUM SERPL-SCNC: 4.2 MMOL/L (ref 3.4–5.3)

## 2017-05-12 PROCEDURE — 25000128 H RX IP 250 OP 636: Performed by: INTERNAL MEDICINE

## 2017-05-12 PROCEDURE — 25000131 ZZH RX MED GY IP 250 OP 636 PS 637: Mod: GY | Performed by: INTERNAL MEDICINE

## 2017-05-12 PROCEDURE — 40000193 ZZH STATISTIC PT WARD VISIT

## 2017-05-12 PROCEDURE — 25000132 ZZH RX MED GY IP 250 OP 250 PS 637: Mod: GY | Performed by: INTERNAL MEDICINE

## 2017-05-12 PROCEDURE — 12000007 ZZH R&B INTERMEDIATE

## 2017-05-12 PROCEDURE — 36415 COLL VENOUS BLD VENIPUNCTURE: CPT | Performed by: INTERNAL MEDICINE

## 2017-05-12 PROCEDURE — 25000125 ZZHC RX 250: Performed by: INTERNAL MEDICINE

## 2017-05-12 PROCEDURE — 97116 GAIT TRAINING THERAPY: CPT | Mod: GP

## 2017-05-12 PROCEDURE — 85018 HEMOGLOBIN: CPT | Performed by: INTERNAL MEDICINE

## 2017-05-12 PROCEDURE — 83735 ASSAY OF MAGNESIUM: CPT | Performed by: INTERNAL MEDICINE

## 2017-05-12 PROCEDURE — 00000146 ZZHCL STATISTIC GLUCOSE BY METER IP

## 2017-05-12 PROCEDURE — S0164 INJECTION PANTROPRAZOLE: HCPCS | Performed by: INTERNAL MEDICINE

## 2017-05-12 PROCEDURE — 84132 ASSAY OF SERUM POTASSIUM: CPT | Performed by: INTERNAL MEDICINE

## 2017-05-12 PROCEDURE — 82565 ASSAY OF CREATININE: CPT | Performed by: INTERNAL MEDICINE

## 2017-05-12 PROCEDURE — 87338 HPYLORI STOOL AG IA: CPT | Performed by: INTERNAL MEDICINE

## 2017-05-12 PROCEDURE — A9270 NON-COVERED ITEM OR SERVICE: HCPCS | Mod: GY | Performed by: INTERNAL MEDICINE

## 2017-05-12 PROCEDURE — 85049 AUTOMATED PLATELET COUNT: CPT | Performed by: INTERNAL MEDICINE

## 2017-05-12 PROCEDURE — 97530 THERAPEUTIC ACTIVITIES: CPT | Mod: GP

## 2017-05-12 PROCEDURE — 99232 SBSQ HOSP IP/OBS MODERATE 35: CPT | Performed by: INTERNAL MEDICINE

## 2017-05-12 RX ORDER — BISACODYL 10 MG
10 SUPPOSITORY, RECTAL RECTAL ONCE
Status: COMPLETED | OUTPATIENT
Start: 2017-05-12 | End: 2017-05-12

## 2017-05-12 RX ORDER — CLONIDINE HYDROCHLORIDE 0.1 MG/1
0.1 TABLET ORAL ONCE
Status: COMPLETED | OUTPATIENT
Start: 2017-05-12 | End: 2017-05-12

## 2017-05-12 RX ORDER — POLYETHYLENE GLYCOL 3350 17 G/17G
17 POWDER, FOR SOLUTION ORAL 2 TIMES DAILY PRN
Status: DISCONTINUED | OUTPATIENT
Start: 2017-05-12 | End: 2017-05-13 | Stop reason: HOSPADM

## 2017-05-12 RX ORDER — LISINOPRIL 20 MG/1
20 TABLET ORAL DAILY
Status: DISCONTINUED | OUTPATIENT
Start: 2017-05-13 | End: 2017-05-13 | Stop reason: HOSPADM

## 2017-05-12 RX ADMIN — PANTOPRAZOLE SODIUM 40 MG: 40 INJECTION, POWDER, FOR SOLUTION INTRAVENOUS at 13:04

## 2017-05-12 RX ADMIN — CLONIDINE HYDROCHLORIDE 0.1 MG: 0.1 TABLET ORAL at 16:24

## 2017-05-12 RX ADMIN — PANTOPRAZOLE SODIUM 40 MG: 40 INJECTION, POWDER, FOR SOLUTION INTRAVENOUS at 21:06

## 2017-05-12 RX ADMIN — PREDNISONE 4 MG: 1 TABLET ORAL at 08:12

## 2017-05-12 RX ADMIN — INSULIN ASPART 1 UNITS: 100 INJECTION, SOLUTION INTRAVENOUS; SUBCUTANEOUS at 14:35

## 2017-05-12 RX ADMIN — METOPROLOL TARTRATE 50 MG: 50 TABLET ORAL at 21:06

## 2017-05-12 RX ADMIN — POTASSIUM CHLORIDE 20 MEQ: 1500 TABLET, EXTENDED RELEASE ORAL at 21:06

## 2017-05-12 RX ADMIN — POLYETHYLENE GLYCOL 3350 17 G: 17 POWDER, FOR SOLUTION ORAL at 17:05

## 2017-05-12 RX ADMIN — METOPROLOL TARTRATE 50 MG: 50 TABLET ORAL at 08:13

## 2017-05-12 RX ADMIN — POTASSIUM CHLORIDE 20 MEQ: 1500 TABLET, EXTENDED RELEASE ORAL at 08:13

## 2017-05-12 RX ADMIN — HYDROCHLOROTHIAZIDE 25 MG: 25 TABLET ORAL at 08:13

## 2017-05-12 RX ADMIN — LISINOPRIL 10 MG: 10 TABLET ORAL at 08:13

## 2017-05-12 RX ADMIN — BISACODYL 10 MG: 10 SUPPOSITORY RECTAL at 17:04

## 2017-05-12 RX ADMIN — SODIUM CHLORIDE 8 MG/HR: 9 INJECTION, SOLUTION INTRAVENOUS at 00:39

## 2017-05-12 NOTE — PROGRESS NOTES
"GASTROENTEROLOGY PROGRESS NOTE    SUBJECTIVE:  Feeling well this morning, hungry    OBJECTIVE:    /61 (BP Location: Left arm)  Pulse 61  Temp 98.1  F (36.7  C) (Oral)  Resp 16  Ht 1.626 m (5' 4\")  Wt 59.8 kg (131 lb 12.8 oz)  SpO2 96%  BMI 22.62 kg/m2  Temp (24hrs), Av.5  F (36.9  C), Min:97.7  F (36.5  C), Max:99.7  F (37.6  C)    Patient Vitals for the past 72 hrs:   Weight   17 0613 59.8 kg (131 lb 12.8 oz)   17 0305 59.1 kg (130 lb 4.8 oz)   05/10/17 1827 58.5 kg (129 lb)       Intake/Output Summary (Last 24 hours) at 17 0859  Last data filed at 17 0721   Gross per 24 hour   Intake             2190 ml   Output             2850 ml   Net             -660 ml         PHYSICAL EXAM    Constitutional: NAD, comfortable  Abdomen: soft, non-tender, nondistended        Additional Comments:  ROS, FH, SH: See initial GI consult for details.    I have reviewed the patient's new clinical lab results:    Recent Labs   Lab Test  17   19217   0400  05/10/17   2140  17   0948   16   1152   WBC   --    --   7.5  8.3  5.3   < >  9.4   HGB  9.2*  9.8*  6.3*  8.0*  10.4*   < >  11.1*   MCV   --    --   94  93  92   < >  92   PLT  114*   --   128*  159  176   < >  196   INR   --    --    --    --   0.97   --   1.01    < > = values in this interval not displayed.     Recent Labs   Lab Test  05/12/17   0627  05/10/17   21417   0948  16   0533   NA   --   139  143  140   POTASSIUM  4.2  4.2  4.0  3.9   CHLORIDE   --   106  108  107   CO2   --   27  30  28   BUN   --   37*  28  32*   CR  1.08*  1.14*  1.25*  1.12*   ANIONGAP   --   6  5  5   CHRISTIANA   --   9.0  8.9  8.9     Recent Labs   Lab Test  17   0058  17   1103  13   2020   PROTEIN  Negative  30*  30*         Active Problems:    Melena    Assessment: Patient found to have duodenal ulcer 5/10/17 by EGD, likely ASA/prednisone related. Hgb stable.  Tolerating clears.     " Plan:   - repeat EGD in 8 weeks due to degree of inflammation seen today  - follow up on stool h. Pylori  - Change to IV bid ppi today  - advance to full liquids today, if doing well can do regular diet tomorrow  - We will contact the patient regarding follow up EGD  - take ASA with food, no NSAIDs for pain  - discharge on omeprazole 40mg BID    We will sign off, please call if issues arise. From GI standpoint, would keep on IV ppi today and discharge on Omeprazole 40mg BID for 2 months until EGD, then likely 40mg once daily.        Ivone Hines  [facility]  Office:  [phone]  Cell/Pager:  [phone]

## 2017-05-12 NOTE — PLAN OF CARE
Problem: Goal Outcome Summary  Goal: Goal Outcome Summary  Hgb recheck tonight after transfusions is 9.8. Blood sugars running lower at start of evening.  Given apple juices and now WNL. BPs elevated. MD increases dose of Lisinopril.

## 2017-05-12 NOTE — PROVIDER NOTIFICATION
Dr Garcia texted: Pt has not had BM since Sunday. Do you want her to have some intervention for this?

## 2017-05-12 NOTE — PLAN OF CARE
Problem: Goal Outcome Summary  Goal: Goal Outcome Summary  Outcome: Improving  Pt alert and oriented. Up with SBA. Walked in halls with nursing x2 and PT x1. On 2nd walk with nursing, pt walked without hanging on to IV pole, but said it was uncomfortable as she did not feel steady. Tolerated full liquid diet, advanced to regular diet for dinner. Protonix gtt changed to IV push. Orders to check stool for h pylori, still awaiting specimen. Pt anxious about leaving tomorrow as she lives alone her her son will not be in town yet.

## 2017-05-12 NOTE — PLAN OF CARE
"Problem: Goal Outcome Summary  Goal: Goal Outcome Summary     PT: Patient seen by physical therapy for treatment.  Patient independent with bed mobility with HOB raised and therapist providing line/tube management.  Patient transfers between sitting and standing with SBA, transferred on/off toilet independently using grab bars.  Patient amb 200 feet with SBA with no LOB, patient pushing IV pole, however able to amb independently without support from pole.  Patient negotiated 16 steps with single handrail with step to gait pattern with SBA and line management.  Patient reported \"being shaky\" after stair negotiation, denied SOB or dizziness.  After sitting for 2 min, patient ready to amb again.  Recommended that patient amb 2x more today with nursing staff for increased activity tolerance.  Recommend DC to [facility] with previous level of assist (assist for cleaning, attends meals at the senior center 2-3x/month,etc) when medically appropriate.  Patient reports that son from [place] may come to stay with her for a few days at discharge.      "

## 2017-05-12 NOTE — PROGRESS NOTES
[facility]  Hospitalist Progress Note    Name: Araseli Pavon    MRN: [id number]  Provider:  Robbie Garcia MD, [facility]    Date of Service: 05/12/2017     Reason for Stay (Diagnosis): GI bleed          Summary of hospital stay & Assessment/Plan:   Summary of Stay: Araseli Pavon is a 82 year old female who was admitted on 5/10/2017  82-year-old woman who presents with persistent lightheadedness, fall, possible melena, and acute on chronic anemia. She has a history of coronary artery disease, hypertension, diabetes, and polymyalgia rheumatica.  Hemoglobin dropped down to 6.3, she received 2 units of blood transfusion.  She was scoped by gastroenterology and was found to have duodenal ulcer was significant inflammation    Problem List:   1. Upper GI bleeding from a duodenal ulcer likely combination of prednisone and aspirin  2. Coronary artery disease stable  3. Diabetes mellitus  4. Polymyalgia rheumatica  5. Hypertension      Change to IV twice a day Protonix  She will need aspirin for heart disease, this can be resumed on discharge whether she needs to take it with food and avoid any other nonsteroid anti-inflammatory  Follow-up with gastroenterology on H. pylori antigen and to repeat EGD in 2 months  Continue  lisinopril  10 mg daily for her blood pressure  Resume Lantus half the dose in am    DVT Prophylaxis: Pneumatic Compression Devices  Code Status:  DNR/DNI  Discharge Dispo: Likely home, lives alone  Estimated # of Days until Disch: Possibly tomorrow or Sunday if she continues to be stable without evidence of bleeding        Interval History:           No evidence of bleeding,  Tolerating diet, chest pain or shortness of breath    Case discussed with Patient nurse                Physical Exam:   Physical Exam   Temp: 98.1  F (36.7  C) Temp src: Oral BP: 137/52 Pulse: 61 Heart Rate: 64 Resp: 16 SpO2: 96 % O2 Device: None (Room air)    Vitals:    05/10/17 1827 05/11/17 0305 05/12/17 0613    Weight: 58.5 kg (129 lb) 59.1 kg (130 lb 4.8 oz) 59.8 kg (131 lb 12.8 oz)     I/O last 3 completed shifts:  In: 2190 [P.O.:400; I.V.:1790]  Out: 2350 [Urine:2350]        GENERAL:  Comfortable.   PSYCH: pleasant, oriented, No acute distress.  EYES: PERRLA, Normal conjunctiva.  HEART:  Normal S1, S2 with no edema.  LUNGS:  Clear to auscultation, normal Respiratory effort.  ABDOMEN:  Soft, no hepatosplenomegaly, normal bowel sounds.  SKIN:  Dry to touch, No rash.      Medications     0.9% sodium chloride + KCl 20 mEq/L 75 mL/hr at 05/11/17 2239       pantoprazole  40 mg Intravenous BID     hydrochlorothiazide  25 mg Oral QAM     metoprolol  50 mg Oral BID     potassium chloride SA  20 mEq Oral BID     predniSONE  4 mg Oral QAM     insulin aspart  1-10 Units Subcutaneous TID AC     insulin aspart  1-7 Units Subcutaneous At Bedtime     lisinopril  10 mg Oral Daily     sodium chloride (PF)  3 mL Intracatheter Q8H     Data     -Data reviewed today:  I personally reviewed  all new labs and imaging results over the last 24 hours.      Recent Labs  Lab 05/12/17 0627 05/11/17  1920 05/11/17  0400 05/10/17  2140 05/07/17  0948   WBC  --   --  7.5 8.3 5.3   HGB 9.2* 9.8* 6.3* 8.0* 10.4*   HCT  --   --  19.8* 24.7* 31.9*   MCV  --   --  94 93 92   *  --  128* 159 176       Recent Labs  Lab 05/12/17  0627 05/10/17  2140 05/07/17  0948   NA  --  139 143   POTASSIUM 4.2 4.2 4.0   CHLORIDE  --  106 108   CO2  --  27 30   ANIONGAP  --  6 5   GLC  --  122* 118*   BUN  --  37* 28   CR 1.08* 1.14* 1.25*   GFRESTIMATED 49* 46* 41*   GFRESTBLACK 59* 55* 50*   CHRISTIANA  --  9.0 8.9       No results found for this or any previous visit (from the past 24 hour(s)).    This document was produced using voice recognition software

## 2017-05-12 NOTE — PROGRESS NOTES
"SPIRITUAL HEALTH SERVICES  SPIRITUAL ASSESSMENT Progress Note  [facility] MedSurg 5    PRIMARY FOCUS:     Emotional/spiritual/Scientologist distress    ILLNESS CIRCUMSTANCES:   Reviewed documentation. Reflective conversation shared with Araseli which integrated elements of illness and family narratives.  Reason for visit: request on admission.      Context of Serious Illness/Symptom(s) - GI bleed    Resources for Support -   o Sons - one in [place] on his way to care for his mother on discharge; one in [place]  o Episcopal  o friends    DISTRESS:     Emotional/Existential/Relational Distress - None discussed.    Spiritual/Restoration Distress - None discussed.    Social/Cultural/Economic Distress - None discussed.    SPIRITUAL/Mu-ism COPING:     Holiness/Elvia - Darline Maxwell; \"I thought my  might stop by but he may not be able to make it.\"    Spiritual Practice(s) - Per her request, joined Araseli in prayer.    Emotional/Existential/Relational Connections -   o \"My sons threw a surprise 82th birthday party for me.  One of my sons pointed out that I had so many friends maybe they wouldn't need for me to move to live with my kids.  I'm praying.\"    GOALS OF CARE:    Goals of Care - Remain living in [place].    Meaning/Sense-Making - Not named.    PLAN: Per anticipated discharge, no further visits are planned but are available on request.      Jimi Nixon M.Div.  Staff   Pager [phone]        "

## 2017-05-13 ENCOUNTER — APPOINTMENT (OUTPATIENT)
Dept: PHYSICAL THERAPY | Facility: CLINIC | Age: 82
DRG: 378 | End: 2017-05-13
Payer: MEDICARE

## 2017-05-13 VITALS
WEIGHT: 135.1 LBS | SYSTOLIC BLOOD PRESSURE: 141 MMHG | TEMPERATURE: 99.1 F | HEART RATE: 61 BPM | RESPIRATION RATE: 16 BRPM | BODY MASS INDEX: 23.06 KG/M2 | HEIGHT: 64 IN | DIASTOLIC BLOOD PRESSURE: 63 MMHG | OXYGEN SATURATION: 96 %

## 2017-05-13 LAB
CREAT SERPL-MCNC: 1.14 MG/DL (ref 0.52–1.04)
GFR SERPL CREATININE-BSD FRML MDRD: 46 ML/MIN/1.7M2
GLUCOSE BLDC GLUCOMTR-MCNC: 115 MG/DL (ref 70–99)
GLUCOSE BLDC GLUCOMTR-MCNC: 128 MG/DL (ref 70–99)
GLUCOSE BLDC GLUCOMTR-MCNC: 133 MG/DL (ref 70–99)
HGB BLD-MCNC: 8.7 G/DL (ref 11.7–15.7)
POTASSIUM SERPL-SCNC: 4.5 MMOL/L (ref 3.4–5.3)

## 2017-05-13 PROCEDURE — 00000146 ZZHCL STATISTIC GLUCOSE BY METER IP

## 2017-05-13 PROCEDURE — 99239 HOSP IP/OBS DSCHRG MGMT >30: CPT | Performed by: INTERNAL MEDICINE

## 2017-05-13 PROCEDURE — A9270 NON-COVERED ITEM OR SERVICE: HCPCS | Mod: GY | Performed by: INTERNAL MEDICINE

## 2017-05-13 PROCEDURE — 97530 THERAPEUTIC ACTIVITIES: CPT | Mod: GP

## 2017-05-13 PROCEDURE — 82565 ASSAY OF CREATININE: CPT | Performed by: INTERNAL MEDICINE

## 2017-05-13 PROCEDURE — 40000193 ZZH STATISTIC PT WARD VISIT

## 2017-05-13 PROCEDURE — 36415 COLL VENOUS BLD VENIPUNCTURE: CPT | Performed by: INTERNAL MEDICINE

## 2017-05-13 PROCEDURE — 97116 GAIT TRAINING THERAPY: CPT | Mod: GP

## 2017-05-13 PROCEDURE — 84132 ASSAY OF SERUM POTASSIUM: CPT | Performed by: INTERNAL MEDICINE

## 2017-05-13 PROCEDURE — 25000132 ZZH RX MED GY IP 250 OP 250 PS 637: Mod: GY | Performed by: INTERNAL MEDICINE

## 2017-05-13 PROCEDURE — 25000131 ZZH RX MED GY IP 250 OP 636 PS 637: Mod: GY | Performed by: INTERNAL MEDICINE

## 2017-05-13 PROCEDURE — 25000125 ZZHC RX 250: Performed by: INTERNAL MEDICINE

## 2017-05-13 PROCEDURE — 85018 HEMOGLOBIN: CPT | Performed by: INTERNAL MEDICINE

## 2017-05-13 RX ORDER — LISINOPRIL 20 MG/1
20 TABLET ORAL DAILY
Qty: 20 TABLET | Refills: 0 | Status: SHIPPED | OUTPATIENT
Start: 2017-05-13

## 2017-05-13 RX ORDER — PANTOPRAZOLE SODIUM 40 MG/1
40 TABLET, DELAYED RELEASE ORAL DAILY
Qty: 60 TABLET | Refills: 0 | Status: SHIPPED | OUTPATIENT
Start: 2017-05-13

## 2017-05-13 RX ADMIN — LISINOPRIL 20 MG: 20 TABLET ORAL at 08:14

## 2017-05-13 RX ADMIN — HYDROCHLOROTHIAZIDE 25 MG: 25 TABLET ORAL at 08:14

## 2017-05-13 RX ADMIN — PREDNISONE 4 MG: 1 TABLET ORAL at 08:14

## 2017-05-13 RX ADMIN — INSULIN GLARGINE 10 UNITS: 100 INJECTION, SOLUTION SUBCUTANEOUS at 08:14

## 2017-05-13 RX ADMIN — POTASSIUM CHLORIDE 20 MEQ: 1500 TABLET, EXTENDED RELEASE ORAL at 08:14

## 2017-05-13 RX ADMIN — PANTOPRAZOLE SODIUM 40 MG: 40 INJECTION, POWDER, FOR SOLUTION INTRAVENOUS at 08:36

## 2017-05-13 RX ADMIN — METOPROLOL TARTRATE 50 MG: 50 TABLET ORAL at 08:14

## 2017-05-13 RX ADMIN — POTASSIUM CHLORIDE AND SODIUM CHLORIDE: 900; 150 INJECTION, SOLUTION INTRAVENOUS at 00:20

## 2017-05-13 NOTE — PLAN OF CARE
Problem: Discharge Planning  Goal: Discharge Planning (Adult, OB, Behavioral, Peds)  Outcome: Adequate for Discharge Date Met:  05/13/17  Pt admitted and treated for Upper GI Bleed. Pt alert/oriented, vitals stable. Up w/SBA. Tolerating diet. Blood sugars controlled. Pt given discharge and follow-up instructions and filled prescriptions for protonix and lisinopril. All questions answered and concerns addressed. Pt being transported home by [facility] via personal car. PIV d/c'd without difficulty. Pt brought to front door via wheel-chair, stable at discharge.

## 2017-05-13 NOTE — DISCHARGE SUMMARY
[facility]  Discharge Summary  Hospitalist      Date of Admission:  5/10/2017  Date of Discharge:  5/13/2017  Provider:  Robbie Garcia MD. Formerly [facility]  Date of Service (when I last saw the patient): 05/13/17      Primary Provider: Willam Calderon          Discharge Diagnosis:     Discharge Diagnoses   1. Upper GI bleeding from a duodenal ulcer likely combination of prednisone and aspirin  2. Acute on Chronic Anemia due to blood loss  3. Coronary artery disease stable  4. Diabetes mellitus  5. Polymyalgia rheumatica  6. Hypertension       Other medical issues:  Past Medical History:   Diagnosis Date     CAD (coronary artery disease)      CRF (chronic renal failure)     baseline cr 1.4     Diabetes (H)      DJD (degenerative joint disease)      Glucose intolerance (impaired glucose tolerance)      High cholesterol      Hypertension      Polymyalgia rheumatica (H) 2/13     S/P appendectomy      S/P cholecystectomy      Solitary kidney, acquired      Stented coronary artery 2010    pci RCA ROSALIA 2010         Please see the admission history and physical for full details.     Hospital Course     Araseli Pavon was admitted on 5/10/2017.  The following problems were addressed during her hospitalization:      82-year-old woman who presents with persistent lightheadedness, fall, possible melena, and acute on chronic anemia. She has a history of coronary artery disease, hypertension, diabetes, and polymyalgia rheumatica. Hemoglobin dropped down to 6.3, she received 2 units of blood transfusion. She was scoped by gastroenterology and was found to have duodenal ulcer with significant inflammation  She was treated in the hospital for 48 hours with intravenous proton pump inhibitor initially as a drip then twice a day.  She needs to take proton pump inhibitor twice a day at least with the next 8 weeks  She will need aspirin for heart disease, this can be resumed on Monday she needs to take it with food and  avoid any other nonsteroid anti-inflammatory  Also always take prednisone with food  Follow-up with gastroenterology on H. pylori antigen and to repeat EGD in 2 months  Blood pressure was elevated in the hospital so lisinopril was increased to 20 mg daily she needs close follow-up with her primary care physician, because of the lisinopril increase potassium supplement was discontinued   Resume Lantus and monitor BS daily at home    Pending Results   Unresulted Labs Ordered in the Past 30 Days of this Admission     Date and Time Order Name Status Description    5/12/2017 0859 H Pylori antigen stool In process           Discharge Orders     Reason for your hospital stay   Upper GI bleeding     Follow-up and recommended labs and tests    Follow-up with primary care physician is 3-4 days with a hemoglobin and basic metabolic panel, follow-up on blood pressure and potassium  Follow-up was [facility] on H. pylori results and repeat endoscopy in 8 weeks     Full Code     Diet   Follow this diet upon discharge: Orders Placed This Encounter    Diabetic Diet Adult         Code Status   Full Code       Primary Care Physician   Willam Calderon    Physical Exam   Temp: 99.1  F (37.3  C) Temp src: Oral BP: 141/63   Heart Rate: 63 Resp: 16 SpO2: 96 % O2 Device: None (Room air)    Vitals:    05/11/17 0305 05/12/17 0613 05/13/17 0600   Weight: 59.1 kg (130 lb 4.8 oz) 59.8 kg (131 lb 12.8 oz) 61.3 kg (135 lb 1.6 oz)     Vital Signs with Ranges  Temp:  [97.8  F (36.6  C)-99.9  F (37.7  C)] 99.1  F (37.3  C)  Heart Rate:  [62-65] 63  Resp:  [16] 16  BP: (141-184)/(63-76) 141/63  SpO2:  [96 %-97 %] 96 %  I/O last 3 completed shifts:  In: 3266 [P.O.:1370; I.V.:1896]  Out: 2200 [Urine:2200]    Constitutional:  alert, cooperative, no apparent distress  Respiratory: No increased work of breathing, good air exchange, no crackles or wheezing.  Cardiovascular: apical impulse,normal S1 and S2  GI: bowel sounds present,  soft, non-distended, non-tender      Discharge Disposition   Discharged to home    Consultations This Hospital Stay   GASTROENTEROLOGY IP CONSULT  PHYSICAL THERAPY ADULT IP CONSULT    Time Spent on this Encounter   I, Robbie Garcia, personally saw the patient today and spent greater than 30 minutes discharging this patient.      Discharge Medications   Current Discharge Medication List      START taking these medications    Details   pantoprazole (PROTONIX) 40 MG EC tablet Take 1 tablet (40 mg) by mouth daily Take 30-60 minutes before a meal.  Qty: 60 tablet, Refills: 0    Associated Diagnoses: Melena         CONTINUE these medications which have CHANGED    Details   aspirin 81 MG EC tablet Take 1 tablet (81 mg) by mouth At Bedtime    Comments: Start next Monday  Associated Diagnoses: Coronary artery disease without angina pectoris, unspecified vessel or lesion type, unspecified whether native or transplanted heart      lisinopril (PRINIVIL/ZESTRIL) 20 MG tablet Take 1 tablet (20 mg) by mouth daily  Qty: 20 tablet, Refills: 0    Associated Diagnoses: Coronary artery disease without angina pectoris, unspecified vessel or lesion type, unspecified whether native or transplanted heart         CONTINUE these medications which have NOT CHANGED    Details   insulin glargine (LANTUS) 100 UNIT/ML injection Inject 10 Units Subcutaneous 2 times daily      metoprolol (LOPRESSOR) 50 MG tablet Take 1 tablet (50 mg) by mouth 2 times daily  Qty: 60 tablet, Refills: 0    Associated Diagnoses: Other chest pain      predniSONE (DELTASONE) 1 MG tablet Take 4 mg by mouth every morning      hydrochlorothiazide (HYDRODIURIL) 25 MG tablet Take 25 mg by mouth every morning       polyethylene glycol (MIRALAX/GLYCOLAX) powder Take 0.25 capfuls by mouth daily as needed for constipation      acetaminophen (TYLENOL) 500 MG tablet Take 1,000 mg by mouth 3 times daily as needed for mild pain      Rosuvastatin Calcium (CRESTOR PO) Take 40 mg  by mouth At Bedtime       ORDER FOR DME Equipment being ordered: glucometer  Qty: 1 each, Refills: 0    Associated Diagnoses: Diabetes mellitus, new onset (H)      LIFESCAN FINEPOINT LANCETS MISC Use to test blood sugars 3 times daily or as directed.  Qty: 100 each, Refills: prn    Associated Diagnoses: Diabetes mellitus, new onset (H)      Glucose Blood (ONE TOUCH ULTRA TEST) strip Use to test blood sugars 3 times daily or as directed.  Qty: 100 strip, Refills: prn    Associated Diagnoses: Diabetes mellitus, new onset (H)         STOP taking these medications       cephALEXin (KEFLEX) 500 MG capsule Comments:   Reason for Stopping:         potassium chloride SA (K-DUR,KLOR-CON M) 20 MEQ tablet Comments:   Reason for Stopping:         Omega-3 Fatty Acids (OMEGA-3 FISH OIL PO) Comments:   Reason for Stopping:             Allergies   Allergies   Allergen Reactions     Codeine Sulfate      Sulfa Drugs      Data   Most Recent 3 CBC's:  Recent Labs   Lab Test  05/13/17   0609  05/12/17   0627  05/11/17   1920  05/11/17   0400  05/10/17   2140  05/07/17   0948   WBC   --    --    --   7.5  8.3  5.3   HGB  8.7*  9.2*  9.8*  6.3*  8.0*  10.4*   MCV   --    --    --   94  93  92   PLT   --   114*   --   128*  159  176      Most Recent 3 BMP's:  Recent Labs   Lab Test  05/13/17   0609  05/12/17   0627  05/10/17   2140  05/07/17   0948  09/09/16   0533   NA   --    --   139  143  140   POTASSIUM  4.5  4.2  4.2  4.0  3.9   CHLORIDE   --    --   106  108  107   CO2   --    --   27  30  28   BUN   --    --   37*  28  32*   CR  1.14*  1.08*  1.14*  1.25*  1.12*   ANIONGAP   --    --   6  5  5   CHRISTIANA   --    --   9.0  8.9  8.9   GLC   --    --   122*  118*  89     Most Recent 2 LFT's:No lab results found.  Most Recent INR's and Anticoagulation Dosing History:  Anticoagulation Dose History     Recent Dosing and Labs Latest Ref Rng & Units 9/8/2016 5/7/2017    INR 0.86 - 1.14 1.01 0.97        Most Recent 3 Troponin's:  Recent Labs    Lab Test  05/07/17   0948  09/09/16   0533  09/08/16   2210   TROPI  0.016  0.193*  0.378*     Most Recent Cholesterol Panel:  Recent Labs   Lab Test  09/09/16   0533   CHOL  138   LDL  57   HDL  69   TRIG  61     Most Recent 6 Bacteria Isolates From Any Culture (See EPIC Reports for Culture Details):  Recent Labs   Lab Test  05/07/17   1108   CULT  No growth     Most Recent TSH, T4 and A1c Labs:  Recent Labs   Lab Test  09/09/16   0533  09/08/16   1152   TSH   --   0.83   A1C  6.4*   --      Results for orders placed or performed during the hospital encounter of 05/07/17   CT Head w/o Contrast    Narrative    CT HEAD W/O CONTRAST   5/7/2017 10:43 AM     HISTORY:  fall, head trauma     TECHNIQUE:  5 mm thick axial images of the head. Radiation dose for  this scan was reduced using automated exposure control, adjustment of  the mA and/or KV according to patient size, or iterative  reconstruction technique.     COMPARISON: None.    FINDINGS: There is no evidence of intracranial hemorrhage, mass, acute  infarct or anomaly.       Impression    IMPRESSION: Negative CT scan of the head.    ARLET CASIANO MD           Disclaimer: This note consists of symbols derived from keyboarding, dictation and/or voice recognition software. As a result, there may be errors in the script that have gone undetected. Please consider this when interpreting information found in this chart.

## 2017-05-13 NOTE — PLAN OF CARE
Physical Therapy Discharge Summary    Reason for therapy discharge:    Discharged to home with outpatient therapy.    Progress towards therapy goal(s). See goals on Care Plan in [facility] electronic health record for goal details.  Goals partially met.  Barriers to achieving goals:   discharge from facility.    Therapy recommendation(s):    Continued therapy is recommended.  Rationale/Recommendations:  decreased activity tolerance.

## 2017-05-13 NOTE — PLAN OF CARE
Problem: Goal Outcome Summary  Goal: Goal Outcome Summary  Outcome: Improving  Pt remains in hospital for tx of anemia. Hgb stable at 9.8. A/Ox4, VSS, except HTN, afebrile. Up w/ SBA. Ambulated hallway. Voiding w/o difficulty. LBM 5/12/17, stool sample sent. Regular diet, tolerating, appetite good. Possible d/c home tomorrow or Sunday. Will continue to monitor and provide supportive care.

## 2017-05-13 NOTE — PROGRESS NOTES
Ambulatory Status:  Pt up with SBA; ambulated in the hallway x2.   VSS; low grade temp 99.1   Pain:  denies  Resp: LS clear  GI:  denies nausea.  good appetite and on regular diet.  BS hypoactive.  Passing flatus.  Last BM 5/12.  :  WDL  Skin:  WDL  Tx:  IV protonix  Labs:  Hgb 8.7  Consults:  GI  Disposition:  To d/c to home today (5/13) when son arrives from [place].

## 2017-05-15 LAB
H PYLORI AG STL QL IA: NORMAL
MICRO REPORT STATUS: NORMAL
SPECIMEN SOURCE: NORMAL

## 2021-02-02 NOTE — PLAN OF CARE
Problem: Goal Outcome Summary  Goal: Goal Outcome Summary  Outcome: No Change  Bp 163/73. Other VSS.  Denies pain or nausea.  Blood sugar 115 overnight.  Up with SBA and IV pole.  PT following.  Will monitor.         Unemployed

## (undated) DEVICE — KIT ENDO TURNOVER/PROCEDURE W/CLEAN A SCOPE LINERS 103888

## (undated) RX ORDER — FENTANYL CITRATE 50 UG/ML
INJECTION, SOLUTION INTRAMUSCULAR; INTRAVENOUS
Status: DISPENSED
Start: 2017-05-11